# Patient Record
Sex: MALE | Race: OTHER | NOT HISPANIC OR LATINO | URBAN - METROPOLITAN AREA
[De-identification: names, ages, dates, MRNs, and addresses within clinical notes are randomized per-mention and may not be internally consistent; named-entity substitution may affect disease eponyms.]

---

## 2019-03-01 ENCOUNTER — OUTPATIENT (OUTPATIENT)
Dept: OUTPATIENT SERVICES | Facility: HOSPITAL | Age: 74
LOS: 1 days | End: 2019-03-01
Payer: MEDICAID

## 2019-03-01 PROCEDURE — G9001: CPT

## 2019-03-18 ENCOUNTER — EMERGENCY (EMERGENCY)
Facility: HOSPITAL | Age: 74
LOS: 1 days | Discharge: HOME | End: 2019-03-18
Attending: EMERGENCY MEDICINE
Payer: MEDICAID

## 2019-03-18 VITALS
RESPIRATION RATE: 20 BRPM | DIASTOLIC BLOOD PRESSURE: 96 MMHG | WEIGHT: 139.99 LBS | SYSTOLIC BLOOD PRESSURE: 153 MMHG | TEMPERATURE: 98 F | OXYGEN SATURATION: 98 % | HEART RATE: 79 BPM

## 2019-03-18 LAB
ALBUMIN SERPL ELPH-MCNC: 4.8 G/DL — SIGNIFICANT CHANGE UP (ref 3.3–5)
ALP SERPL-CCNC: 56 U/L — SIGNIFICANT CHANGE UP (ref 40–120)
ALT FLD-CCNC: 25 U/L — SIGNIFICANT CHANGE UP (ref 10–45)
ANION GAP SERPL CALC-SCNC: 14 MMOL/L — SIGNIFICANT CHANGE UP (ref 5–17)
APTT BLD: 30.5 SEC — SIGNIFICANT CHANGE UP (ref 27.5–36.3)
AST SERPL-CCNC: 20 U/L — SIGNIFICANT CHANGE UP (ref 10–40)
BASOPHILS # BLD AUTO: 0 K/UL — SIGNIFICANT CHANGE UP (ref 0–0.2)
BASOPHILS NFR BLD AUTO: 0.4 % — SIGNIFICANT CHANGE UP (ref 0–2)
BILIRUB SERPL-MCNC: 0.4 MG/DL — SIGNIFICANT CHANGE UP (ref 0.2–1.2)
BUN SERPL-MCNC: 21 MG/DL — SIGNIFICANT CHANGE UP (ref 7–23)
CALCIUM SERPL-MCNC: 9.8 MG/DL — SIGNIFICANT CHANGE UP (ref 8.4–10.5)
CHLORIDE SERPL-SCNC: 106 MMOL/L — SIGNIFICANT CHANGE UP (ref 96–108)
CO2 SERPL-SCNC: 23 MMOL/L — SIGNIFICANT CHANGE UP (ref 22–31)
CREAT SERPL-MCNC: 1.11 MG/DL — SIGNIFICANT CHANGE UP (ref 0.5–1.3)
EOSINOPHIL # BLD AUTO: 0.1 K/UL — SIGNIFICANT CHANGE UP (ref 0–0.5)
EOSINOPHIL NFR BLD AUTO: 0.6 % — SIGNIFICANT CHANGE UP (ref 0–6)
GLUCOSE SERPL-MCNC: 101 MG/DL — HIGH (ref 70–99)
HCT VFR BLD CALC: 43.1 % — SIGNIFICANT CHANGE UP (ref 39–50)
HGB BLD-MCNC: 15.3 G/DL — SIGNIFICANT CHANGE UP (ref 13–17)
INR BLD: 1.13 RATIO — SIGNIFICANT CHANGE UP (ref 0.88–1.16)
LYMPHOCYTES # BLD AUTO: 2.6 K/UL — SIGNIFICANT CHANGE UP (ref 1–3.3)
LYMPHOCYTES # BLD AUTO: 23.4 % — SIGNIFICANT CHANGE UP (ref 13–44)
MCHC RBC-ENTMCNC: 31.7 PG — SIGNIFICANT CHANGE UP (ref 27–34)
MCHC RBC-ENTMCNC: 35.5 GM/DL — SIGNIFICANT CHANGE UP (ref 32–36)
MCV RBC AUTO: 89.3 FL — SIGNIFICANT CHANGE UP (ref 80–100)
MONOCYTES # BLD AUTO: 0.7 K/UL — SIGNIFICANT CHANGE UP (ref 0–0.9)
MONOCYTES NFR BLD AUTO: 6.8 % — SIGNIFICANT CHANGE UP (ref 2–14)
NEUTROPHILS # BLD AUTO: 7.5 K/UL — HIGH (ref 1.8–7.4)
NEUTROPHILS NFR BLD AUTO: 68.8 % — SIGNIFICANT CHANGE UP (ref 43–77)
PLATELET # BLD AUTO: 167 K/UL — SIGNIFICANT CHANGE UP (ref 150–400)
POTASSIUM SERPL-MCNC: 4.3 MMOL/L — SIGNIFICANT CHANGE UP (ref 3.5–5.3)
POTASSIUM SERPL-SCNC: 4.3 MMOL/L — SIGNIFICANT CHANGE UP (ref 3.5–5.3)
PROT SERPL-MCNC: 7.8 G/DL — SIGNIFICANT CHANGE UP (ref 6–8.3)
PROTHROM AB SERPL-ACNC: 13 SEC — HIGH (ref 10–12.9)
RBC # BLD: 4.83 M/UL — SIGNIFICANT CHANGE UP (ref 4.2–5.8)
RBC # FLD: 12.4 % — SIGNIFICANT CHANGE UP (ref 10.3–14.5)
SODIUM SERPL-SCNC: 143 MMOL/L — SIGNIFICANT CHANGE UP (ref 135–145)
TROPONIN T, HIGH SENSITIVITY RESULT: 12 NG/L — SIGNIFICANT CHANGE UP (ref 0–51)
WBC # BLD: 11 K/UL — HIGH (ref 3.8–10.5)
WBC # FLD AUTO: 11 K/UL — HIGH (ref 3.8–10.5)

## 2019-03-18 PROCEDURE — 70450 CT HEAD/BRAIN W/O DYE: CPT | Mod: 26

## 2019-03-18 PROCEDURE — 99284 EMERGENCY DEPT VISIT MOD MDM: CPT | Mod: 25

## 2019-03-18 PROCEDURE — 93010 ELECTROCARDIOGRAM REPORT: CPT

## 2019-03-18 NOTE — ED ADULT TRIAGE NOTE - CHIEF COMPLAINT QUOTE
c/o numbness on tongue around 10pm last night, also noticed slurring of speech that started around 8pm last night.

## 2019-03-18 NOTE — ED STATDOCS - OBJECTIVE STATEMENT
72 y/o male with pmhx of HLD, HTN, and DM Type II c/o yesterday evening felt some numbness in his tongue. States that there is no improvement in current sxs today. Son notes that there is some change in his speech and slowed gait. Pt also notes some leg pain and weakness/numbness in hands however sxs has resolved. Pt had a stress test at cardiologist today at 8:30; blood test showed his cholesterol is high. Currently on simvastatin.

## 2019-03-18 NOTE — ED STATDOCS - NS_ ATTENDINGSCRIBEDETAILS _ED_A_ED_FT
The scribe's documentation has been prepared under my direction and personally reviewed by me in its entirety. I confirm that the note above accurately reflects all work, treatment, procedures, and medical decision making performed by me.  ANNALISE Tucker MD

## 2019-03-18 NOTE — ED ADULT TRIAGE NOTE - RESPIRATORY RATE (BREATHS/MIN)
[facility] Case Criteria    Suitable to On-board:  Yes   Successfully On-boarded:  Pending   Reason for On-boarded Pending:  Letter Sent, Will attempt with phone call   Date Sent:  1/15/19   Reason(s) for Pending On-boarded Narrative:  1/15/19: [facility] Welcome Letter sent.      STEPHENIE Ford, RN  Telephonic RN Care Coordinator  Phone: [phone]   20

## 2019-03-19 VITALS
TEMPERATURE: 98 F | RESPIRATION RATE: 16 BRPM | HEART RATE: 63 BPM | DIASTOLIC BLOOD PRESSURE: 97 MMHG | SYSTOLIC BLOOD PRESSURE: 143 MMHG | OXYGEN SATURATION: 99 %

## 2019-03-19 PROCEDURE — 70498 CT ANGIOGRAPHY NECK: CPT

## 2019-03-19 PROCEDURE — 93005 ELECTROCARDIOGRAM TRACING: CPT

## 2019-03-19 PROCEDURE — 70496 CT ANGIOGRAPHY HEAD: CPT | Mod: 26

## 2019-03-19 PROCEDURE — 84484 ASSAY OF TROPONIN QUANT: CPT

## 2019-03-19 PROCEDURE — 70496 CT ANGIOGRAPHY HEAD: CPT

## 2019-03-19 PROCEDURE — 70498 CT ANGIOGRAPHY NECK: CPT | Mod: 26

## 2019-03-19 PROCEDURE — 80053 COMPREHEN METABOLIC PANEL: CPT

## 2019-03-19 PROCEDURE — 99284 EMERGENCY DEPT VISIT MOD MDM: CPT

## 2019-03-19 PROCEDURE — 85027 COMPLETE CBC AUTOMATED: CPT

## 2019-03-19 PROCEDURE — 70450 CT HEAD/BRAIN W/O DYE: CPT

## 2019-03-19 PROCEDURE — 85610 PROTHROMBIN TIME: CPT

## 2019-03-19 PROCEDURE — 85730 THROMBOPLASTIN TIME PARTIAL: CPT

## 2019-03-19 NOTE — ED ADULT NURSE REASSESSMENT NOTE - NS ED NURSE REASSESS COMMENT FT1
Report received from YESENIA Silverman . Pt AAOx4, NAD, resp nonlabored, skin warm/dry, resting comfortably in bed. Pt denies headache, dizziness, chest pain, palpitations, SOB, abd pain, n/v/d, urinary symptoms, fevers, chills, weakness at this time. Pt passed dysphagia screening. Pt endorses he came in because he felt his speech was slurry and numbness in his mouth. Upon assessment, pt did not display slurry speech and denies numbness in his mouth.

## 2019-03-19 NOTE — ED PROVIDER NOTE - CLINICAL SUMMARY MEDICAL DECISION MAKING FREE TEXT BOX
73M hx dm, htn, hld, on asa p/w slurred speech/facial droop. Outside interventional window. High risk/ABCD2 score. Will consult neuro to discuss inpatient vs outpatient MRI/neuro workup.

## 2019-03-19 NOTE — ED PROVIDER NOTE - PROGRESS NOTE DETAILS
AG Pgy2: consulted neuro - will come to evaluate patient. Pt seen by neurology will d/c home with urgent follow up with Dr. Kinney, pt has already scheduled an appointment. Will get MRI outpatient.

## 2019-03-19 NOTE — ED PROVIDER NOTE - OBJECTIVE STATEMENT
73M hx DM, htn, hld p/w tongue numbness. Patient reports tongue heaviness/slurred speech beginning sunday night. Symptoms persisted until last night so decided to come into the ER. Family notes slight R sided facial droop and slurred speech. Patient had stress test yesterday that was WNL, but told his cholesterol was very high. Denies headache, focal extremity weakness/sensory changes, dysphagia, vision changes, dizziness, no other complaints. 73M hx DM, htn, hld p/w tongue numbness. Patient reports tongue heaviness/slurred speech beginning  night. Symptoms persisted until last night so decided to come into the ER. Family notes slight R sided facial droop and slurred speech. Patient had stress test yesterday that was WNL, but told his cholesterol was very high. Denies headache, focal extremity weakness/sensory changes, dysphagia, vision changes, dizziness, no other complaints.    Attendinyo male presents with slurred speech since  night.  pt notes constant symptoms and no other weakness.  no pain.

## 2019-03-19 NOTE — ED ADULT NURSE NOTE - OBJECTIVE STATEMENT
72 yo M pmh of HTN, DM2, HCL came to ED c/o numbness and tingling in the tongue with slurred speech starting 3/17/19 in the evening.  As per son, pt went to cardiologist yesterday for stress test but symptoms started prior to assessment.  Denies CP, back pain, SOB, fevers/chills, n/v/d, lightheadedness, dizziness, changes in urinary or bowel habits.  A&Ox4, gross neuro intact, +strength and sensation bilaterally in all extremities, able to answer questions and follow commands.  Skin w/d/i.   VSS.  Safety and comfort maintained.  Family present at bedside. Will continue to monitor.

## 2019-03-19 NOTE — ED PROVIDER NOTE - CARE PROVIDER_API CALL
Nelson Kinney (DO)  Neurology; Vascular Neurology  [address] [facility] [address]  [place] [address]  Phone: [phone]  Fax: [phone]  Follow Up Time:

## 2019-03-19 NOTE — CONSULT NOTE ADULT - SUBJECTIVE AND OBJECTIVE BOX
KARIME [id number] is a 73y old  Male who presents with a chief complaint of     HPI: 72 yo M with history of HTN, HLD, DM, presents to the ER with slurred speech for 2 days. Symptoms started abruptly on Sunday afternoon at 5 pm. He also reported some difficulty with writing.  No diplopia, dysphagia, numbness/tingling. Denies any facial weakness, tinnitus. Still w/ some slurred speech.  NIHSS 1. MRS 0.    MEDICATIONS  (STANDING):    MEDICATIONS  (PRN):    PAST MEDICAL & SURGICAL HISTORY:  Hypercholesterolemia  HTN (hypertension)  Diabetes type 2, controlled    FAMILY HISTORY:    Allergies    No Known Allergies    Intolerances        SHx - No smoking, No ETOH, No drug abuse      Review of Systems:    see hpi      Vital Signs Last 24 Hrs  T(C): 36.4 (19 Mar 2019 08:00), Max: 36.6 (18 Mar 2019 20:35)  T(F): 97.6 (19 Mar 2019 08:00), Max: 97.8 (18 Mar 2019 20:35)  HR: 63 (19 Mar 2019 08:00) (63 - 79)  BP: 143/97 (19 Mar 2019 08:00) (143/97 - 153/96)  BP(mean): --  RR: 16 (19 Mar 2019 08:00) (16 - 20)  SpO2: 99% (19 Mar 2019 08:00) (98% - 99%)    Neurological Exam:    Mental Status: Orientated to self, date and place.  Attention intact.  No dysarthria. Speech fluent.  Cranial Nerves:   PERRL, EOMI, VFF, no nystagmus. CN V1-3 intact to light touch.  No facial asymmetry. mild to moderate dysarthria. Tongue, uvula and palate midline.  Sternocleidomastoid and Trapezius intact bilaterally.    Motor:   Tone: normal.                    Strength:       [] Upper extremity                      Delt       Bicep    Tricep                                                  R         5/5        5/5        5/5       5/5                                               L          5/5        5/5        5/5       5/5    [] Lower extremity                       HF          KE          KF        DF         PF                                               R        5/5        5/5        5/5       5/5       5/5                                               L         5/5        5/5       5/5       5/5        5/5    rapid finger movements slower on left than right  rolling arm test w/ right arm orbiting around left arm    Pronator drift: none                 Dysmetria: None to finger-nose-finger or heel-shin-heel  No truncal ataxia.    Tremor: No resting, postural or action tremor.  No myoclonus.    Sensation: intact to light touch, vibration and proprioception    Deep Tendon Reflexes:     Biceps          Triceps      BR        Patellar        Ankle         Babinski                                         R       2+                   2+           2+            2+               2+           downgoing                                         L        2+                  2+           3+            2+               2+           downgoing    Gait: normal.      Other:    03-18    143  |  106  |  21  ----------------------------<  101<H>  4.3   |  23  |  1.11    Ca    9.8      18 Mar 2019 21:59    TPro  7.8  /  Alb  4.8  /  TBili  0.4  /  DBili  x   /  AST  20  /  ALT  25  /  AlkPhos  56  03-18                            15.3   11.0  )-----------( 167      ( 18 Mar 2019 21:59 )             43.1       Radiology    CT: < from: CT Angio Head w/ IV Cont (03.19.19 @ 08:36) >  IMPRESSION:       Tortuous extracranial vessels likely reflecting hypertension without   significant ICA origin stenosis.    ICA cavernous and supraclinoid atherosclerotic calcification with   stenosis, patent proximal anterior, middle, and posterior cerebral   arteries, fetal leftPCA. Volume loss, microvascular disease, remote and   age indeterminate lacunar infarcts, MR is more sensitive for new stroke   like symptoms. No new hemorrhage or shift..    < end of copied text >    MRI  EKG:  tele:  TTE:  EEG:

## 2019-03-19 NOTE — CONSULT NOTE ADULT - ASSESSMENT
Impression: Probably had a small lacunar stroke causing slurred speech, and some subtle left side weakness (fine finger movements and arm rolling test). CTH shows age-indeterminate infarcts. CTA h/n negative. Can follow up outpatient, but will need close outpatient follow up.    Plan:  -c/w ASA 81  -increase atorvastatin to 80 mg  -MRI brain w/o contrast to be done outpatient  -MRA head w/o contrast to be done outpatient  -MRA neck w/ contrast to be done outpatient  -remainder of workup can be done outpatient    pending discussion with attending neurologist Impression: Probably had a small lacunar stroke causing slurred speech, and some subtle left side weakness (fine finger movements and arm rolling test). CTH shows age-indeterminate infarcts. CTA h/n negative. Can follow up outpatient, but will need close outpatient follow up.    Plan:  -c/w ASA 81  -increase atorvastatin to 80 mg  -MRI brain w/o contrast to be done outpatient  -remainder of workup can be done outpatient    case discussed with Dr. Nelson Kinney, attending neurologist Impression: Probably had a small lacunar stroke causing slurred speech, and some subtle left side weakness (fine finger movements and arm rolling test). CTH shows age-indeterminate infarcts. CTA h/n negative. Can follow up outpatient, but will need close outpatient follow up.    Plan:  -c/w ASA 81  -increase atorvastatin to 80 mg  -MRI brain w/o contrast to be done outpatient  -remainder of workup can be done outpatient  -patient can follow up with Dr. Nelson Kinney, attending neurologist. Phone: [phone]    case discussed with Dr. Nelson Kinney, attending neurologist

## 2019-03-19 NOTE — ED PROVIDER NOTE - NEUROLOGICAL, MLM
A&Ox3, slightly slurred speech, slight R lower facial droop, cn2-12 otherwise grossly intact. Normal gait.

## 2019-03-19 NOTE — ED PROVIDER NOTE - NSFOLLOWUPINSTRUCTIONS_ED_ALL_ED_FT
1. Return to ED for worsening, progressive or any other concerning symptoms   2. Follow up with your primary care doctor in 2-3days  3. Follow up with Dr. Kinney  4. Return for worsening of speech, numbness or tingling in body.  5. Increase your simvastatin to 80mg daily.

## 2019-03-19 NOTE — ED ADULT NURSE NOTE - CHPI ED NUR SYMPTOMS NEG
no dizziness/no fever/no change in level of consciousness/no weakness/no vomiting/no confusion/no nausea/no numbness/no blurred vision/no loss of consciousness

## 2019-03-19 NOTE — ED ADULT NURSE NOTE - NSIMPLEMENTINTERV_GEN_ALL_ED
Implemented All Universal Safety Interventions:  [place] to call system. Call bell, personal items and telephone within reach. Instruct patient to call for assistance. Room bathroom lighting operational. Non-slip footwear when patient is off stretcher. Physically safe environment: no spills, clutter or unnecessary equipment. Stretcher in lowest position, wheels locked, appropriate side rails in place.

## 2019-03-20 DIAGNOSIS — Z71.89 OTHER SPECIFIED COUNSELING: ICD-10-CM

## 2019-03-28 PROBLEM — Z00.00 ENCOUNTER FOR PREVENTIVE HEALTH EXAMINATION: Status: ACTIVE | Noted: 2019-03-28

## 2019-03-29 PROBLEM — E78.00 PURE HYPERCHOLESTEROLEMIA, UNSPECIFIED: Chronic | Status: ACTIVE | Noted: 2019-03-19

## 2019-03-29 PROBLEM — I10 ESSENTIAL (PRIMARY) HYPERTENSION: Chronic | Status: ACTIVE | Noted: 2019-03-19

## 2019-03-29 PROBLEM — E11.9 TYPE 2 DIABETES MELLITUS WITHOUT COMPLICATIONS: Chronic | Status: ACTIVE | Noted: 2019-03-19

## 2019-04-11 ENCOUNTER — OUTPATIENT (OUTPATIENT)
Dept: OUTPATIENT SERVICES | Facility: HOSPITAL | Age: 74
LOS: 1 days | End: 2019-04-11
Payer: MEDICAID

## 2019-04-11 ENCOUNTER — APPOINTMENT (OUTPATIENT)
Dept: MRI IMAGING | Facility: IMAGING CENTER | Age: 74
End: 2019-04-11
Payer: MEDICAID

## 2019-04-11 DIAGNOSIS — R51 HEADACHE: ICD-10-CM

## 2019-04-11 PROCEDURE — 70551 MRI BRAIN STEM W/O DYE: CPT

## 2019-04-11 PROCEDURE — 70551 MRI BRAIN STEM W/O DYE: CPT | Mod: 26

## 2021-06-25 ENCOUNTER — EMERGENCY (EMERGENCY)
Facility: HOSPITAL | Age: 76
LOS: 1 days | Discharge: HOME | End: 2021-06-25
Attending: STUDENT IN AN ORGANIZED HEALTH CARE EDUCATION/TRAINING PROGRAM
Payer: MEDICAID

## 2021-06-25 VITALS
RESPIRATION RATE: 19 BRPM | OXYGEN SATURATION: 99 % | DIASTOLIC BLOOD PRESSURE: 78 MMHG | TEMPERATURE: 98 F | HEART RATE: 70 BPM | SYSTOLIC BLOOD PRESSURE: 129 MMHG

## 2021-06-25 VITALS
DIASTOLIC BLOOD PRESSURE: 75 MMHG | OXYGEN SATURATION: 99 % | WEIGHT: 145.06 LBS | HEART RATE: 72 BPM | SYSTOLIC BLOOD PRESSURE: 155 MMHG | TEMPERATURE: 98 F | HEIGHT: 62 IN | RESPIRATION RATE: 18 BRPM

## 2021-06-25 LAB
ALBUMIN SERPL ELPH-MCNC: 4.4 G/DL — SIGNIFICANT CHANGE UP (ref 3.3–5)
ALP SERPL-CCNC: 81 U/L — SIGNIFICANT CHANGE UP (ref 40–120)
ALT FLD-CCNC: 34 U/L — SIGNIFICANT CHANGE UP (ref 10–45)
ANION GAP SERPL CALC-SCNC: 14 MMOL/L — SIGNIFICANT CHANGE UP (ref 5–17)
APPEARANCE UR: CLEAR — SIGNIFICANT CHANGE UP
AST SERPL-CCNC: 25 U/L — SIGNIFICANT CHANGE UP (ref 10–40)
BACTERIA # UR AUTO: NEGATIVE — SIGNIFICANT CHANGE UP
BASOPHILS # BLD AUTO: 0.02 K/UL — SIGNIFICANT CHANGE UP (ref 0–0.2)
BASOPHILS NFR BLD AUTO: 0.2 % — SIGNIFICANT CHANGE UP (ref 0–2)
BILIRUB SERPL-MCNC: 0.5 MG/DL — SIGNIFICANT CHANGE UP (ref 0.2–1.2)
BILIRUB UR-MCNC: NEGATIVE — SIGNIFICANT CHANGE UP
BUN SERPL-MCNC: 19 MG/DL — SIGNIFICANT CHANGE UP (ref 7–23)
CALCIUM SERPL-MCNC: 9.4 MG/DL — SIGNIFICANT CHANGE UP (ref 8.4–10.5)
CHLORIDE SERPL-SCNC: 107 MMOL/L — SIGNIFICANT CHANGE UP (ref 96–108)
CO2 SERPL-SCNC: 19 MMOL/L — LOW (ref 22–31)
COLOR SPEC: SIGNIFICANT CHANGE UP
CREAT SERPL-MCNC: 1.14 MG/DL — SIGNIFICANT CHANGE UP (ref 0.5–1.3)
DIFF PNL FLD: NEGATIVE — SIGNIFICANT CHANGE UP
EOSINOPHIL # BLD AUTO: 0.09 K/UL — SIGNIFICANT CHANGE UP (ref 0–0.5)
EOSINOPHIL NFR BLD AUTO: 0.8 % — SIGNIFICANT CHANGE UP (ref 0–6)
EPI CELLS # UR: 0 /HPF — SIGNIFICANT CHANGE UP
GLUCOSE SERPL-MCNC: 74 MG/DL — SIGNIFICANT CHANGE UP (ref 70–99)
GLUCOSE UR QL: NEGATIVE — SIGNIFICANT CHANGE UP
HCT VFR BLD CALC: 44.7 % — SIGNIFICANT CHANGE UP (ref 39–50)
HGB BLD-MCNC: 15.3 G/DL — SIGNIFICANT CHANGE UP (ref 13–17)
IMM GRANULOCYTES NFR BLD AUTO: 0.4 % — SIGNIFICANT CHANGE UP (ref 0–1.5)
KETONES UR-MCNC: NEGATIVE — SIGNIFICANT CHANGE UP
LEUKOCYTE ESTERASE UR-ACNC: NEGATIVE — SIGNIFICANT CHANGE UP
LYMPHOCYTES # BLD AUTO: 1.83 K/UL — SIGNIFICANT CHANGE UP (ref 1–3.3)
LYMPHOCYTES # BLD AUTO: 16.2 % — SIGNIFICANT CHANGE UP (ref 13–44)
MCHC RBC-ENTMCNC: 29.3 PG — SIGNIFICANT CHANGE UP (ref 27–34)
MCHC RBC-ENTMCNC: 34.2 GM/DL — SIGNIFICANT CHANGE UP (ref 32–36)
MCV RBC AUTO: 85.5 FL — SIGNIFICANT CHANGE UP (ref 80–100)
MONOCYTES # BLD AUTO: 0.7 K/UL — SIGNIFICANT CHANGE UP (ref 0–0.9)
MONOCYTES NFR BLD AUTO: 6.2 % — SIGNIFICANT CHANGE UP (ref 2–14)
NEUTROPHILS # BLD AUTO: 8.63 K/UL — HIGH (ref 1.8–7.4)
NEUTROPHILS NFR BLD AUTO: 76.2 % — SIGNIFICANT CHANGE UP (ref 43–77)
NITRITE UR-MCNC: NEGATIVE — SIGNIFICANT CHANGE UP
NRBC # BLD: 0 /100 WBCS — SIGNIFICANT CHANGE UP (ref 0–0)
PH UR: 5.5 — SIGNIFICANT CHANGE UP (ref 5–8)
PLATELET # BLD AUTO: 173 K/UL — SIGNIFICANT CHANGE UP (ref 150–400)
POTASSIUM SERPL-MCNC: 3.5 MMOL/L — SIGNIFICANT CHANGE UP (ref 3.5–5.3)
POTASSIUM SERPL-SCNC: 3.5 MMOL/L — SIGNIFICANT CHANGE UP (ref 3.5–5.3)
PROT SERPL-MCNC: 8.2 G/DL — SIGNIFICANT CHANGE UP (ref 6–8.3)
PROT UR-MCNC: ABNORMAL
RBC # BLD: 5.23 M/UL — SIGNIFICANT CHANGE UP (ref 4.2–5.8)
RBC # FLD: 12.9 % — SIGNIFICANT CHANGE UP (ref 10.3–14.5)
RBC CASTS # UR COMP ASSIST: 1 /HPF — SIGNIFICANT CHANGE UP (ref 0–4)
SODIUM SERPL-SCNC: 140 MMOL/L — SIGNIFICANT CHANGE UP (ref 135–145)
SP GR SPEC: 1.02 — SIGNIFICANT CHANGE UP (ref 1.01–1.02)
UROBILINOGEN FLD QL: NEGATIVE — SIGNIFICANT CHANGE UP
WBC # BLD: 11.31 K/UL — HIGH (ref 3.8–10.5)
WBC # FLD AUTO: 11.31 K/UL — HIGH (ref 3.8–10.5)
WBC UR QL: 1 /HPF — SIGNIFICANT CHANGE UP (ref 0–5)

## 2021-06-25 PROCEDURE — 80053 COMPREHEN METABOLIC PANEL: CPT

## 2021-06-25 PROCEDURE — 87086 URINE CULTURE/COLONY COUNT: CPT

## 2021-06-25 PROCEDURE — 74177 CT ABD & PELVIS W/CONTRAST: CPT

## 2021-06-25 PROCEDURE — 99284 EMERGENCY DEPT VISIT MOD MDM: CPT | Mod: 25

## 2021-06-25 PROCEDURE — 81001 URINALYSIS AUTO W/SCOPE: CPT

## 2021-06-25 PROCEDURE — 99284 EMERGENCY DEPT VISIT MOD MDM: CPT

## 2021-06-25 PROCEDURE — 85025 COMPLETE CBC W/AUTO DIFF WBC: CPT

## 2021-06-25 PROCEDURE — 74177 CT ABD & PELVIS W/CONTRAST: CPT | Mod: 26,MA

## 2021-06-25 NOTE — ED PROVIDER NOTE - NSFOLLOWUPINSTRUCTIONS_ED_ALL_ED_FT
You were seen in the emergency department for difficulty urinating. You had blood tests, a urine test, and a CAT scan of your abdomen performed. It was found that there may be a malignancy (possible Cancer). Please follow up with a urologist regarding this.    Please also follow up with your primary care physician. Please call to make an appointment.    Please return to the emergency department for worsening of your symptoms.

## 2021-06-25 NOTE — ED PROVIDER NOTE - PROGRESS NOTE DETAILS
Prabhakar Cotter MD. pt hilton dout ot me pending reassment and labs. labs non actionable, including UA.  exam with chaperone dr. mercy clark:no testicular swellin gor tenderness; +cremasteric reflexes bilaterally; pt is not circumcised but no discharge, lesions or penile tenderness. explained possible prostatitis and need for rectal exam: prostate is non tender, firm, not boggy. pt also endorsing constipation. given this, will obtain ct a/p w/ iv contrast. pt agreeable. will reassess Prabhakar Cotter MD. CT noted. pt states in [place], 3 weeks ago, he had a CT and biopsy o fhis prostate and was told estiven the potentially did have prostate CA. I informed him of the results of our CT today re: likely neoplasm of prostate. I urged pt to f/u with urology regardin this. pt Understands. pt to be discharged

## 2021-06-25 NOTE — ED PROVIDER NOTE - CLINICAL SUMMARY MEDICAL DECISION MAKING FREE TEXT BOX
77 y/o M PMH HTN DM on metformin, BPH presents to ED c/o burning w/ urination and low back pain x6 weeks worse today w/ difficulty urinating. no fevers or difficulty walking, leg weakness. concern for UTI, BPH, urinary retention, pyelo. plan labs urine bladder scan

## 2021-06-25 NOTE — ED ADULT NURSE REASSESSMENT NOTE - NS ED NURSE REASSESS COMMENT FT1
Report received from RN JOSSY. PT A/O x3. PT denies pain/ discomfort. Bladder scan performed 232 cc urine present in bladder ED Resident Kg made aware. Daughter contacted and told care plan as instructed by PT.

## 2021-06-25 NOTE — ED ADULT NURSE NOTE - OBJECTIVE STATEMENT
76y m pt just returned from [facility]; c/o pelvic pain x 6 weeks; had ultrasound and ct scan while there; pt last urinated at 10am this morning; pt denies hematuria; aox3; no resp distress; no chest pain; no abd pain; no n/v/d; denies fever/chills; no blood in stool; iv placed; labs drawn; adv pt need clean catch urine sample; explained how; call bell in hand; safety/comfort maintained

## 2021-06-25 NOTE — ED PROVIDER NOTE - PATIENT PORTAL LINK FT
You can access the FollowMyHealth Patient Portal offered by [facility] by registering at the following website: [email or web address]. By joining [facility]’s FollowMyHealth portal, you will also be able to view your health information using other applications (apps) compatible with our system.

## 2021-06-25 NOTE — ED PROVIDER NOTE - NSFOLLOWUPCLINICS_GEN_ALL_ED_FT
[facility] - Urology  Urology  [address], 3rd & 4th [place] [address]  Phone: [phone]  Fax:

## 2021-06-25 NOTE — ED PROVIDER NOTE - PHYSICAL EXAMINATION
Gen: well developed male NAD   HEENT: NCAT, EOMI, no nasal discharge, mucous membranes moist  CV: RRR, +S1/S2, no M/R/G  Resp: CTAB, no W/R/R  GI: Abdomen soft non-distended, NTTP, no masses  : no CVAT b/l   MSK: No open wounds, no bruising, no LE edema. no midline tenderness to c/t/l spine  Neuro: A&Ox4, following commands, moving all four extremities spontaneously  Psych: appropriate mood

## 2021-06-25 NOTE — ED PROVIDER NOTE - OBJECTIVE STATEMENT
77 y/o M PMH HTN DM on metformin, BPH presents to ED c/o burning w/ urination x6 weeks worse today w/ difficulty urinating. reports last urination 10am, feels like he has to pee. 75 y/o M PMH HTN DM on metformin, BPH presents to ED c/o burning w/ urination and low back pain x6 weeks worse today w/ difficulty urinating. reports last urination 10am, feels like he has to pee. Denies trauma or falls. Denies hematuria, abd pain, blood in stool, diarrhea.

## 2021-06-25 NOTE — ED PROVIDER NOTE - ATTENDING CONTRIBUTION TO CARE
76 M w/ hx of pmh of HTN, DM on metformin, BPH presents to the ER w/ burning on urination and low back pain, no hx of falls, plan for labs, UA and reassessment 76 M w/ hx of pmh of HTN, DM on metformin, BPH presents to the ER w/ burning on urination and low back pain, no hx of falls, plan for labs, UA and reassessment; UA clean, rectal exam w/ mild discomfort w/ hx of recent bx will obtain ct imaging, pt w/ findings for possible prostate ca pt to follow up w/ urology verbalized understanding

## 2021-06-26 LAB
CULTURE RESULTS: NO GROWTH — SIGNIFICANT CHANGE UP
SPECIMEN SOURCE: SIGNIFICANT CHANGE UP

## 2021-08-23 ENCOUNTER — APPOINTMENT (OUTPATIENT)
Dept: UROLOGY | Facility: CLINIC | Age: 76
End: 2021-08-23

## 2021-09-02 ENCOUNTER — APPOINTMENT (OUTPATIENT)
Dept: INTERVENTIONAL RADIOLOGY/VASCULAR | Facility: CLINIC | Age: 76
End: 2021-09-02

## 2021-09-02 VITALS
HEIGHT: 68 IN | RESPIRATION RATE: 16 BRPM | SYSTOLIC BLOOD PRESSURE: 147 MMHG | DIASTOLIC BLOOD PRESSURE: 95 MMHG | OXYGEN SATURATION: 99 % | HEART RATE: 68 BPM | BODY MASS INDEX: 21.98 KG/M2 | WEIGHT: 145 LBS

## 2021-09-02 DIAGNOSIS — I10 ESSENTIAL (PRIMARY) HYPERTENSION: ICD-10-CM

## 2021-09-02 DIAGNOSIS — R19.00 INTRA-ABDOMINAL AND PELVIC SWELLING, MASS AND LUMP, UNSPECIFIED SITE: ICD-10-CM

## 2021-09-02 DIAGNOSIS — Z78.9 OTHER SPECIFIED HEALTH STATUS: ICD-10-CM

## 2021-09-02 DIAGNOSIS — Z86.39 PERSONAL HISTORY OF OTHER ENDOCRINE, NUTRITIONAL AND METABOLIC DISEASE: ICD-10-CM

## 2021-09-02 DIAGNOSIS — C61 MALIGNANT NEOPLASM OF PROSTATE: ICD-10-CM

## 2021-09-02 DIAGNOSIS — E11.9 TYPE 2 DIABETES MELLITUS W/OUT COMPLICATIONS: ICD-10-CM

## 2021-09-02 PROCEDURE — XXXXX: CPT | Mod: 1L

## 2021-09-02 RX ORDER — METFORMIN HYDROCHLORIDE 1000 MG/1
1000 TABLET, FILM COATED ORAL
Refills: 0 | Status: ACTIVE | COMMUNITY

## 2021-09-02 RX ORDER — GLIPIZIDE 2.5 MG/1
2.5 TABLET, FILM COATED, EXTENDED RELEASE ORAL
Refills: 0 | Status: ACTIVE | COMMUNITY

## 2021-09-02 RX ORDER — ASPIRIN 81 MG
81 TABLET, DELAYED RELEASE (ENTERIC COATED) ORAL
Refills: 0 | Status: ACTIVE | COMMUNITY

## 2021-09-02 RX ORDER — SIMVASTATIN 80 MG/1
80 TABLET, FILM COATED ORAL
Refills: 0 | Status: ACTIVE | COMMUNITY

## 2021-09-02 RX ORDER — AMLODIPINE AND VALSARTAN 5; 160 MG/1; MG/1
5-160 TABLET, FILM COATED ORAL
Refills: 0 | Status: ACTIVE | COMMUNITY

## 2021-09-02 NOTE — REVIEW OF SYSTEMS
[Fever] : no fever [Loss Of Hearing] : no hearing loss [Chills] : no chills [Chest Pain] : no chest pain [Palpitations] : no palpitations [Shortness Of Breath] : no shortness of breath [Diarrhea] : no diarrhea [Easy Bleeding] : no tendency for easy bleeding [Easy Bruising] : no tendency for easy bruising

## 2021-09-02 NOTE — ASSESSMENT
[FreeTextEntry1] : Patient is a 76 year old male with pelvic mass and recently diagnosed prostate cancer referred for consultation for image guided biopsy. Discussed with patient alternative therapies rather than image-guided biopsy, such as surgical open biopsy versus further imaging studies and/or labs. Patient understands the risks involved with biopsy (i.e. bleeding, infection, etc.). The patient understands the risks versus benefits for an image-guided biopsy and consents to the procedure\par \par -plan NPO after midnight day prior to procedure\par -plan for biopsy (prone) left sided trans-gluteal\par

## 2021-09-02 NOTE — PHYSICAL EXAM
[Alert] : alert [No Respiratory Distress] : no respiratory distress [No Accessory Muscle Use] : no accessory muscle use [Clear to Auscultation] : lungs were clear to auscultation bilaterally [Normal Rate] : heart rate was normal  [Not Tender] : non-tender [Soft] : abdomen soft [Not Distended] : not distended [Oriented x3] : oriented to person, place, and time

## 2021-09-02 NOTE — HISTORY OF PRESENT ILLNESS
[FreeTextEntry1] : 76 years old male with history prostate cancer diagnosed in May 2021 and has not started any treatment yet. He was diagnosed in [place] with prostate cancer.  Pt noted to have urinary retention initially and had CT A/P done that showed large Large mass in the prostate consistent with neoplasm. Additional inseparable presacral mass which is amenable to transgluteal CT-guided biopsy. Indeterminate nodule in Morison's pouch.  Patient had prostate bx in [facility]. \par pt followed up with his urologist and referred the pt to IR for possible presacral mass biopsy. \par \par Denies any recent SOB, CP, fever, chills, n/v/d. \par \par Patient sates she has been feeling well overall. Appetite has been good no unintentional weight loss.\par \par \par Patient states he takes Aspirin 81 mg daily as a preventative and he took it last on 09/01/21. Patient is aware to hold Aspirin for five days prior to biopsy.

## 2021-09-02 NOTE — DATA REVIEWED
[FreeTextEntry1] : CT abdomen images reviewed and results discussed at length with the patient.\par

## 2021-09-03 LAB
ANION GAP SERPL CALC-SCNC: 13 MMOL/L
BASOPHILS # BLD AUTO: 0.02 K/UL
BASOPHILS NFR BLD AUTO: 0.2 %
BUN SERPL-MCNC: 22 MG/DL
CALCIUM SERPL-MCNC: 10 MG/DL
CHLORIDE SERPL-SCNC: 109 MMOL/L
CO2 SERPL-SCNC: 21 MMOL/L
CREAT SERPL-MCNC: 1.18 MG/DL
EOSINOPHIL # BLD AUTO: 0.07 K/UL
EOSINOPHIL NFR BLD AUTO: 0.7 %
GLUCOSE SERPL-MCNC: 175 MG/DL
HCT VFR BLD CALC: 44.1 %
HGB BLD-MCNC: 14.9 G/DL
IMM GRANULOCYTES NFR BLD AUTO: 0.4 %
LYMPHOCYTES # BLD AUTO: 2.13 K/UL
LYMPHOCYTES NFR BLD AUTO: 22.3 %
MAN DIFF?: NORMAL
MCHC RBC-ENTMCNC: 30.3 PG
MCHC RBC-ENTMCNC: 33.8 GM/DL
MCV RBC AUTO: 89.8 FL
MONOCYTES # BLD AUTO: 0.68 K/UL
MONOCYTES NFR BLD AUTO: 7.1 %
NEUTROPHILS # BLD AUTO: 6.63 K/UL
NEUTROPHILS NFR BLD AUTO: 69.3 %
PLATELET # BLD AUTO: NORMAL K/UL
POTASSIUM SERPL-SCNC: 4.6 MMOL/L
RBC # BLD: 4.91 M/UL
RBC # FLD: 14 %
SODIUM SERPL-SCNC: 143 MMOL/L
WBC # FLD AUTO: 9.57 K/UL

## 2021-09-12 ENCOUNTER — APPOINTMENT (OUTPATIENT)
Dept: DISASTER EMERGENCY | Facility: CLINIC | Age: 76
End: 2021-09-12

## 2021-09-12 DIAGNOSIS — Z01.818 ENCOUNTER FOR OTHER PREPROCEDURAL EXAMINATION: ICD-10-CM

## 2021-09-13 ENCOUNTER — APPOINTMENT (OUTPATIENT)
Dept: DISASTER EMERGENCY | Facility: CLINIC | Age: 76
End: 2021-09-13

## 2021-09-13 LAB — SARS-COV-2 N GENE NPH QL NAA+PROBE: NOT DETECTED

## 2021-09-15 ENCOUNTER — OUTPATIENT (OUTPATIENT)
Dept: OUTPATIENT SERVICES | Facility: HOSPITAL | Age: 76
LOS: 1 days | End: 2021-09-15
Payer: MEDICAID

## 2021-09-15 DIAGNOSIS — Q89.9 CONGENITAL MALFORMATION, UNSPECIFIED: ICD-10-CM

## 2021-09-15 DIAGNOSIS — R19.00 INTRA-ABDOMINAL AND PELVIC SWELLING, MASS AND LUMP, UNSPECIFIED SITE: ICD-10-CM

## 2021-09-15 PROCEDURE — 74018 RADEX ABDOMEN 1 VIEW: CPT | Mod: 26

## 2021-09-15 NOTE — CHART NOTE - NSCHARTNOTEFT_GEN_A_CORE
Patient presented to IR for biopsy of large mass in the pelvis involving the prostate and abutting rectum     Most recent available imaging from June of 2021 demonstrating these findings.    Patient was seen in office for biopsy consult in september without interval imaging. Patient states his only surgical history is biopsy of the prostate    Patient was placed prone in the CT scanner and sedated for planned biopsy. On initial imaging, the mass is nearly completely resolved with a small component adjacent to the rectum. The prostate appears intervally resected/reduced in size as well.    Patient was referred to IR by Urologist Rosario Willis, office number of . When this number is called, an answering machine message states that the office is closed until October 11; however, there is an ability to leave a message. I left a message with a call back to my cell phone number.    Given that I was unable to get in touch with the referrer in a timely manner, decision made to defer biopsy at this time. Patient has either been treated for the mass, thereby a diagnosis should be known by someone which would mean the biopsy is not indicated, versus the lesion resolved on its own and likely does not need to be biopsied.     Should Dr Willis still require biopsy to direct treatment, IR can reschedule patient for a biopsy at that time Patient presented to IR for biopsy of large mass in the pelvis involving the prostate and abutting rectum     Most recent available imaging from June of 2021 demonstrating these findings.    Patient was seen in office for biopsy consult in september without interval imaging. Patient states his only surgical history is biopsy of the prostate. Patient says he was given a capsule and a pill for treatment    Patient was placed prone in the CT scanner and sedated for planned biopsy. On initial imaging, the mass is nearly completely resolved with a small component adjacent to the rectum. The prostate appears intervally resected/reduced in size as well.    Patient was referred to IR by Urologist Rosario Willis, office number of . When this number is called, an answering machine message states that the office is closed until October 11; however, there is an ability to leave a message. I left a message with a call back to my cell phone number.    Given that I was unable to get in touch with the referrer in a timely manner, decision made to defer biopsy at this time. Patient has either been treated for the mass, thereby a diagnosis should be known by someone which would mean the biopsy is not indicated, versus the lesion resolved on its own and likely does not need to be biopsied.     Should Dr Willis still require biopsy to direct treatment, IR can reschedule patient for a biopsy at that time    Update:    Contacted patient's PMD Meaghan Gonzalez ([phone]) regarding patient. Dr Gonzalez states that patient had been previously evaluated in [place] with ultrasound and biopsy. Most recent imaging that she had was CT from June 2021 and that imaging is available to me as well. She states that the patient told her he had been receiving injections from the urologist regarding treatment for the mass. She says the patient does not have any additional surgical history.    Given significant reduction in size of lesion, unclear treatment and surgical history, would defer biopsy at this time. Discussed with Dr Gonzalez and patient repeating imaging in 1 month and if lesion is still present, can re-discuss biopsying at that time. Would recommend MRI of the pelvis with and without contrast if no contraindications or CT Abdomen/Pelvis with contrast    Patient and Dr Gonzalez were amenable to plan Patient presented to IR for biopsy of large mass in the pelvis involving the prostate and abutting rectum     Most recent available imaging from June of 2021 demonstrating these findings.    Patient was seen in office for biopsy consult in september without interval imaging. Patient states his only surgical history is biopsy of the prostate. Patient says he was given a capsule and a pill for treatment    Patient was placed prone in the CT scanner and sedated for planned biopsy. On initial imaging, the mass is nearly completely resolved with a small component adjacent to the rectum. The prostate appears intervally resected/reduced in size as well.    Patient was referred to IR by Urologist Rosario Willis, office number of . When this number is called, an answering machine message states that the office is closed until October 11; however, there is an ability to leave a message. I left a message with a call back to my cell phone number.    Given that I was unable to get in touch with the referrer, decision made to defer biopsy at this time. Patient has either been treated for the mass, thereby a diagnosis should be known by someone which would mean the biopsy is not indicated, versus the lesion resolved on its own and likely does not need to be biopsied.     Should Dr Willis still require biopsy to direct treatment, IR can reschedule patient for a biopsy at that time    Update:    Contacted patient's PMD Meaghan Gonzalez ([phone]) regarding patient. Dr Gonzalez states that patient had been previously evaluated in [place] with ultrasound and biopsy. Most recent imaging that she had was CT from June 2021 and that imaging is available to me as well. She states that the patient told her he had been receiving injections from the urologist regarding treatment for the mass. She says the patient does not have any additional surgical history.    Given significant reduction in size of lesion, unclear treatment and surgical history but presumed response, would defer biopsy at this time. Discussed with Dr Gonzalez and patient repeating imaging in 1 month and if lesion is still present, can re-discuss biopsying at that time. Would recommend MRI of the pelvis with and without contrast if no contraindications or CT Abdomen/Pelvis with contrast    Patient and Dr Gonzalez were amenable to plan

## 2021-09-22 DIAGNOSIS — C61 MALIGNANT NEOPLASM OF PROSTATE: ICD-10-CM

## 2021-09-22 DIAGNOSIS — R19.00 INTRA-ABDOMINAL AND PELVIC SWELLING, MASS AND LUMP, UNSPECIFIED SITE: ICD-10-CM

## 2021-10-14 ENCOUNTER — APPOINTMENT (OUTPATIENT)
Dept: MRI IMAGING | Facility: IMAGING CENTER | Age: 76
End: 2021-10-14
Payer: MEDICAID

## 2021-10-14 ENCOUNTER — OUTPATIENT (OUTPATIENT)
Dept: OUTPATIENT SERVICES | Facility: HOSPITAL | Age: 76
LOS: 1 days | End: 2021-10-14
Payer: MEDICAID

## 2021-10-14 ENCOUNTER — TRANSCRIPTION ENCOUNTER (OUTPATIENT)
Age: 76
End: 2021-10-14

## 2021-10-14 DIAGNOSIS — C61 MALIGNANT NEOPLASM OF PROSTATE: ICD-10-CM

## 2021-10-14 DIAGNOSIS — D49.89 NEOPLASM OF UNSPECIFIED BEHAVIOR OF OTHER SPECIFIED SITES: ICD-10-CM

## 2021-10-14 PROCEDURE — 72197 MRI PELVIS W/O & W/DYE: CPT

## 2021-10-14 PROCEDURE — A9585: CPT

## 2021-10-14 PROCEDURE — 76498P: CUSTOM | Mod: 26

## 2021-10-14 PROCEDURE — 76498 UNLISTED MR PROCEDURE: CPT

## 2021-10-14 PROCEDURE — 72197 MRI PELVIS W/O & W/DYE: CPT | Mod: 26

## 2022-09-11 ENCOUNTER — EMERGENCY (EMERGENCY)
Facility: HOSPITAL | Age: 77
LOS: 1 days | Discharge: HOME | End: 2022-09-11
Attending: EMERGENCY MEDICINE
Payer: MEDICAID

## 2022-09-11 VITALS
DIASTOLIC BLOOD PRESSURE: 89 MMHG | SYSTOLIC BLOOD PRESSURE: 176 MMHG | RESPIRATION RATE: 18 BRPM | OXYGEN SATURATION: 97 % | WEIGHT: 143.08 LBS | HEART RATE: 69 BPM | HEIGHT: 62 IN | TEMPERATURE: 98 F

## 2022-09-11 LAB
ALBUMIN SERPL ELPH-MCNC: 4.6 G/DL — SIGNIFICANT CHANGE UP (ref 3.3–5)
ALP SERPL-CCNC: 74 U/L — SIGNIFICANT CHANGE UP (ref 40–120)
ALT FLD-CCNC: 26 U/L — SIGNIFICANT CHANGE UP (ref 10–45)
ANION GAP SERPL CALC-SCNC: 12 MMOL/L — SIGNIFICANT CHANGE UP (ref 5–17)
APPEARANCE UR: ABNORMAL
APTT BLD: 32.8 SEC — SIGNIFICANT CHANGE UP (ref 27.5–35.5)
AST SERPL-CCNC: 26 U/L — SIGNIFICANT CHANGE UP (ref 10–40)
BACTERIA # UR AUTO: NEGATIVE — SIGNIFICANT CHANGE UP
BILIRUB SERPL-MCNC: 0.4 MG/DL — SIGNIFICANT CHANGE UP (ref 0.2–1.2)
BILIRUB UR-MCNC: NEGATIVE — SIGNIFICANT CHANGE UP
BUN SERPL-MCNC: 24 MG/DL — HIGH (ref 7–23)
CALCIUM SERPL-MCNC: 9.8 MG/DL — SIGNIFICANT CHANGE UP (ref 8.4–10.5)
CHLORIDE SERPL-SCNC: 106 MMOL/L — SIGNIFICANT CHANGE UP (ref 96–108)
CO2 SERPL-SCNC: 23 MMOL/L — SIGNIFICANT CHANGE UP (ref 22–31)
COLOR SPEC: ABNORMAL
CREAT SERPL-MCNC: 1.45 MG/DL — HIGH (ref 0.5–1.3)
DIFF PNL FLD: ABNORMAL
EGFR: 50 ML/MIN/1.73M2 — LOW
EPI CELLS # UR: 3 /HPF — SIGNIFICANT CHANGE UP
GLUCOSE SERPL-MCNC: 155 MG/DL — HIGH (ref 70–99)
GLUCOSE UR QL: ABNORMAL
HCT VFR BLD CALC: 39.6 % — SIGNIFICANT CHANGE UP (ref 39–50)
HGB BLD-MCNC: 13.6 G/DL — SIGNIFICANT CHANGE UP (ref 13–17)
HYALINE CASTS # UR AUTO: 2 /LPF — SIGNIFICANT CHANGE UP (ref 0–2)
INR BLD: 1.11 RATIO — SIGNIFICANT CHANGE UP (ref 0.88–1.16)
KETONES UR-MCNC: NEGATIVE — SIGNIFICANT CHANGE UP
LEUKOCYTE ESTERASE UR-ACNC: ABNORMAL
MAGNESIUM SERPL-MCNC: 2 MG/DL — SIGNIFICANT CHANGE UP (ref 1.6–2.6)
MCHC RBC-ENTMCNC: 30.4 PG — SIGNIFICANT CHANGE UP (ref 27–34)
MCHC RBC-ENTMCNC: 34.3 GM/DL — SIGNIFICANT CHANGE UP (ref 32–36)
MCV RBC AUTO: 88.4 FL — SIGNIFICANT CHANGE UP (ref 80–100)
NITRITE UR-MCNC: NEGATIVE — SIGNIFICANT CHANGE UP
PH UR: 5.5 — SIGNIFICANT CHANGE UP (ref 5–8)
POTASSIUM SERPL-MCNC: 3.8 MMOL/L — SIGNIFICANT CHANGE UP (ref 3.5–5.3)
POTASSIUM SERPL-SCNC: 3.8 MMOL/L — SIGNIFICANT CHANGE UP (ref 3.5–5.3)
PROT SERPL-MCNC: 7.7 G/DL — SIGNIFICANT CHANGE UP (ref 6–8.3)
PROT UR-MCNC: ABNORMAL
PROTHROM AB SERPL-ACNC: 12.9 SEC — SIGNIFICANT CHANGE UP (ref 10.5–13.4)
RBC # BLD: 4.48 M/UL — SIGNIFICANT CHANGE UP (ref 4.2–5.8)
RBC # FLD: 12.8 % — SIGNIFICANT CHANGE UP (ref 10.3–14.5)
RBC CASTS # UR COMP ASSIST: 1431 /HPF — HIGH (ref 0–4)
SODIUM SERPL-SCNC: 141 MMOL/L — SIGNIFICANT CHANGE UP (ref 135–145)
SP GR SPEC: 1.02 — SIGNIFICANT CHANGE UP (ref 1.01–1.02)
UROBILINOGEN FLD QL: NEGATIVE — SIGNIFICANT CHANGE UP
WBC # BLD: 11.06 K/UL — HIGH (ref 3.8–10.5)
WBC # FLD AUTO: 11.06 K/UL — HIGH (ref 3.8–10.5)
WBC UR QL: 17 /HPF — HIGH (ref 0–5)

## 2022-09-11 PROCEDURE — 99284 EMERGENCY DEPT VISIT MOD MDM: CPT | Mod: 25

## 2022-09-11 PROCEDURE — 80053 COMPREHEN METABOLIC PANEL: CPT

## 2022-09-11 PROCEDURE — 99285 EMERGENCY DEPT VISIT HI MDM: CPT

## 2022-09-11 PROCEDURE — 74176 CT ABD & PELVIS W/O CONTRAST: CPT | Mod: 26,MA

## 2022-09-11 PROCEDURE — 85610 PROTHROMBIN TIME: CPT

## 2022-09-11 PROCEDURE — 81001 URINALYSIS AUTO W/SCOPE: CPT

## 2022-09-11 PROCEDURE — 74176 CT ABD & PELVIS W/O CONTRAST: CPT | Mod: MA

## 2022-09-11 PROCEDURE — 85730 THROMBOPLASTIN TIME PARTIAL: CPT

## 2022-09-11 PROCEDURE — 87086 URINE CULTURE/COLONY COUNT: CPT

## 2022-09-11 PROCEDURE — 83735 ASSAY OF MAGNESIUM: CPT

## 2022-09-11 PROCEDURE — 85025 COMPLETE CBC W/AUTO DIFF WBC: CPT

## 2022-09-11 NOTE — ED PROVIDER NOTE - OBJECTIVE STATEMENT
77M with hx of prostate cancer, HTN, DM2  hematuria and dysuria for 2 days 77M with hx of prostate cancer on lupron every 3 months (urologist Dr. Lund), HTN, DM2, hypothyroidism p/w hematuria and dysuria for 3 days. Patient reports that he started noticing drops of blood in his urine with associated burning pain on urination. No clots passed. No recent trauma or instrumentation, no back pain, no f/c. No hx of kidney stones. Not taking AC. Able to urinate okay but started noticing drops of blood from urethra that would persist despite stopped urinary stream. Progressively worsening bloody urine with frequent episodes so came to ED for evaluation

## 2022-09-11 NOTE — ED PROVIDER NOTE - NSICDXPASTMEDICALHX_GEN_ALL_CORE_FT
PAST MEDICAL HISTORY:  Diabetes type 2, controlled     HTN (hypertension)     Hypercholesterolemia     Hypothyroidism     Prostate cancer

## 2022-09-11 NOTE — ED PROVIDER NOTE - PROGRESS NOTE DETAILS
u/a with RBC, no bacteriuria. CTAP without kidney stones or bladder mass. discussed results with patient and family at bedside. all questions answered. encouraged patient to follow up with urologist outpatient for likely cystoscopy

## 2022-09-11 NOTE — ED PROVIDER NOTE - NS ED ROS FT
Constitutional: no fever and no chills  Eyes: no discharge, no irritation, no pain, no visual changes  ENMT: no ear pain or hearing loss, no dysphagia or throat pain  Neck: no pain, no stiffness, no swollen glands  CV: no chest pain, no palpitations, no edema  Resp: no cough, no shortness of breath  Abd: no abdominal pain, no nausea or vomiting, no diarrhea  : +dysuria, +hematuria  MSK: no back pain, no neck pain, no joint pain  Neuro: no LOC, no gait abnormality, no headache, no sensory deficits, no weakness  Skin: no rashes, no lacerations, no lesions

## 2022-09-11 NOTE — ED ADULT NURSE NOTE - OBJECTIVE STATEMENT
77M with hx of prostate cancer on lupron every 3 months (urologist Dr. Lund), HTN, DM2, hypothyroidism p/w hematuria and dysuria for 3 days. Patient reports that he started noticing drops of blood in his urine with associated burning pain on urination. No clots passed. No recent trauma or instrumentation, no back pain, no f/c. No hx of kidney stones. Not taking AC. Able to urinate okay but started noticing drops of blood from urethra that would persist despite stopped urinary stream. Progressively worsening bloody urine with frequent episodes so came to ED for evaluation

## 2022-09-11 NOTE — ED PROVIDER NOTE - CLINICAL SUMMARY MEDICAL DECISION MAKING FREE TEXT BOX
77M with hx of prostate cancer on lupron every 3 months (urologist Dr. Lund), HTN, DM2, hypothyroidism p/w hematuria and dysuria for 3 days. will r/o kidney stones, UTI, bladder tumor with labs, u/a, urine cultures, CTAP noncon

## 2022-09-11 NOTE — ED PROVIDER NOTE - PHYSICAL EXAMINATION
PHYSICAL EXAM:  GENERAL: Sitting comfortable in bed, in no acute distress  HENMT: Atraumatic, moist mucous membranes, no oropharyngeal exudates or vesicles, uvula is midline EYES: Clear bilaterally, PERRL, EOMs intact b/l  HEART: RRR, S1/S2, no murmur/gallops/rubs  RESPIRATORY: Clear to auscultation bilaterally, no wheezes/rhonchi/rales  ABDOMEN: +BS, soft, nontender, nondistended, no CVA tenderness  EXTREMITIES: No lower extremity edema, +2 radial pulses b/l  NEURO:  A&Ox4, no focal motor deficits or sensory deficits   Heme/LYMPH: No ecchymosis or bruising, no anterior/posterior cervical or supraclavicular LAD  SKIN:  Skin normal color for race, warm, dry and intact. No evidence of rash.

## 2022-09-11 NOTE — ED PROVIDER NOTE - PATIENT PORTAL LINK FT
You can access the FollowMyHealth Patient Portal offered by [facility] by registering at the following website: [email or web address]. By joining [email or web address]’s FollowMyHealth portal, you will also be able to view your health information using other applications (apps) compatible with our system.

## 2022-09-11 NOTE — ED PROVIDER NOTE - NSFOLLOWUPINSTRUCTIONS_ED_ALL_ED_FT
You came to the hospital because you had bloody urine and burning pain with urination. You got a urine test which showed that you had red blood cells in your urine but no bacteria or other evidence of a UTI. You got a cat scan of your abdomen and pelvis which didn't show a kidney stone or bladder mass which may explain your symptoms. Please follow up with your urologist Dr. Willis upon discharge to continue your evaluation.

## 2022-09-11 NOTE — ED PROVIDER NOTE - ATTENDING CONTRIBUTION TO CARE
76 yo male hx of prostate CA p/w gross hematuria.  no evidence of obstruction.  denies having this previously.  CT non-con pelvis stone eval, UA, reassess.

## 2022-09-11 NOTE — ED ADULT NURSE NOTE - NSICDXPASTSURGICALHX_GEN_ALL_CORE_FT
Verified Results  TSH WITH REFLEX 13Mar2017 10:36PM DAVION FRAUSTO     Test Name Result Flag Reference   TSH 0.711 mcUnits/mL  0.350-5.000   Findings most consistent with euthyroid state, no additional testing suggested. TSH may be normal in patients with thyroid dysfunction and pituitary disease. Clinical correlation recommended.  (Reflex TSH algorithm is not recommended in hospitalized patients. A variety of drugs, as well as serious acute and chronic illnesses may alter thyroid function tests. Commonly implicated drugs include glucocorticoids, dopamine, carbamazepine, iodine, amiodarone, lithium and heparin.)     COMP METABOLIC PANEL WITH CBCA (CPNL,CBCA) 13Mar2017 10:36PM DAVION FRAUSTO     Test Name Result Flag Reference   WHITE BLOOD COUNT 7.3 K/mcL  4.2-11.0   RED CELL COUNT 5.04 mil/mcL  4.50-5.90   HEMOGLOBIN 15.9 g/dl  13.0-17.0   HEMATOCRIT 45.9 %  39.0-51.0   MEAN CORPUSCULAR VOLUME 91.1 fL  78.0-100.0   MEAN CORPUSCULAR HEMOGLOBIN 31.5 pg  26.0-34.0   MEAN CORPUSCULAR HGB CONC 34.6 g/dl  32.0-36.5   RDW-CV 12.7 %  11.0-15.0   PLATELET COUNT 361 K/mcL  140-450   DIFF TYPE      AUTOMATED DIFFERENTIAL   CHRISTOPHER% 61 %     LYM% 29 %     MON% 9 %     EOS% 1 %     BASO% 0 %     CHRISTOPHER ABS 4.4 K/mcL  1.8-7.7   LYM ABS 2.1 K/mcL  1.0-4.8   MON ABS 0.7 K/mcL  0.3-0.9   EOS ABS 0.1 K/mcL  0.1-0.5   BASO ABS 0.0 K/mcL  0.0-0.3   FASTING STATUS UNKNOWN hrs     SODIUM 142 mmol/L  135-145   POTASSIUM 4.5 mmol/L  3.4-5.1   CHLORIDE 102 mmol/L     CARBON DIOXIDE 28 mmol/L  21-32   ANION GAP 16 mmol/L  10-20   GLUCOSE 94 mg/dl  65-99   BUN 10 mg/dl  6-20   CREATININE 1.07 mg/dl  0.67-1.17   GFR EST. AMER >90     In patients with known chronic kidney disease, the stage of disease based on eGFR is interpreted as follows:  eGFR results = or >90 mL/min/1.73m2 = Stage I normal kidney function.   GFR EST.NONAFRI AMER 82     In patients with known chronic kidney disease, the stage of disease based on eGFR is interpreted as  follows:  eGFR results 60-89 mL/min/1.73m2 = Stage II CKD (chronic kidney disease), or mild kidney disease.   BUN/CREATININE RATIO 9  7-25   CALCIUM 9.8 mg/dl  8.4-10.2   BILIRUBIN TOTAL 0.4 mg/dl  0.2-1.0   GOT/AST 31 Units/L  <38   GPT/ALT 34 Units/L  <79   ALKALINE PHOSPHATASE 89 Units/L     TOTAL PROTEIN 8.1 g/dl  6.4-8.2   ALBUMIN 4.8 g/dl  3.6-5.1   GLOBULIN (CALCULATED) 3.3 g/dl  2.0-4.0   A/G RATIO 1.5  1.0-2.4     VITAMIN D,25 HYDROXY 13Mar2017 10:36PM DAVION FRAUSTO   [Mar 20, 2017 7:12PM DAVION FRAUSTO]  Labs in good range with the exception of vitamin D which is low consistent with vitamin D deficiency. Please take ergocalciferol 50,000 units once a week for 8 weeks and repeat the vitamin D level in 8 weeks.    Thyroid was in good range.     Test Name Result Flag Reference   VIT D,25 HYDROXY 19.1 ng/ml L 30.0-100.0   <20  ng/mL=Vitamin D deficiency  20-29  ng/mL=Vitamin D insufficiency   ng/mL=Optimal Vitamin D  >150 ng/mL=Possible toxicity     URINALYSIS WITH MICROSCOPIC EXAM W/O C/S 13Mar2017 12:01AM DAVION FRAUSTO     Test Name Result Flag Reference   URINE COLOR YELLOW  YELLOW   APPEARANCE, URINE CLEAR     URINE GLUCOSE NEGATIVE mg/dl  NEGATIVE   URINE BILIRUBIN NEGATIVE  NEGATIVE   KETONES NEGATIVE mg/dl  NEGATIVE   URINE SPECIFIC GRAVITY 1.016  1.005-1.030   RBC-URINE NEGATIVE  NEGATIVE   PH-URINE 6.0 Units  5.0-7.0   URINE PROTEIN NEGATIVE mg/dl  NEGATIVE   UROBILINOGEN-URINE 0.2 mg/dl  0.0-1.0   NITRITE NEGATIVE  NEGATIVE   WBC-URINE NEGATIVE  NEGATIVE   SQUAMOUS EPITHELIAL CELLS 1 to 5 /HPF  0-5   ERYTHROCYTES NONE SEEN /HPF  0-3   LEUKOCYTES 1 to 5 /HPF  0-5   BACTERIA NONE SEEN /HPF  NONE SEEN   HYALINE CASTS NONE SEEN /LPF  0-5   SPECIMEN TYPE      URINE, CLEAN CATCH/MIDSTREAM   MUCUS PRESENT       TEST IN QUESTION, REFRIGERATED 13Mar2017 12:01AM DAVION FRAUSTO     Test Name Result Flag Reference   TEST IN QUESTION, REFRIGERATED      GRAY TOP URINE TUBE RECEIVED WITHOUT A TEST  ORDERED. TRIED CALLING BUT UNABLE TO RESOLVE ISSUE. IF TESTING IS NEEDED PLEASE CONTACT CLIENT SERVICES AT [phone].        PAST SURGICAL HISTORY:  No significant past surgical history

## 2022-09-11 NOTE — ED PROVIDER NOTE - CARE PLAN
Principal Discharge DX:	Hematuria   1 Principal Discharge DX:	Hematuria  Assessment and plan of treatment:	- CTAP noncon, u/a and culture, CBC, CMP, coags

## 2022-09-12 VITALS
TEMPERATURE: 98 F | HEART RATE: 60 BPM | OXYGEN SATURATION: 95 % | RESPIRATION RATE: 16 BRPM | DIASTOLIC BLOOD PRESSURE: 84 MMHG | SYSTOLIC BLOOD PRESSURE: 150 MMHG

## 2022-09-12 LAB
BASOPHILS # BLD AUTO: 0.03 K/UL — SIGNIFICANT CHANGE UP (ref 0–0.2)
BASOPHILS NFR BLD AUTO: 0.3 % — SIGNIFICANT CHANGE UP (ref 0–2)
CULTURE RESULTS: NO GROWTH — SIGNIFICANT CHANGE UP
EOSINOPHIL # BLD AUTO: 0.16 K/UL — SIGNIFICANT CHANGE UP (ref 0–0.5)
EOSINOPHIL NFR BLD AUTO: 1.4 % — SIGNIFICANT CHANGE UP (ref 0–6)
IMM GRANULOCYTES NFR BLD AUTO: 0.3 % — SIGNIFICANT CHANGE UP (ref 0–1.5)
LYMPHOCYTES # BLD AUTO: 3.36 K/UL — HIGH (ref 1–3.3)
LYMPHOCYTES # BLD AUTO: 30.4 % — SIGNIFICANT CHANGE UP (ref 13–44)
MONOCYTES # BLD AUTO: 0.74 K/UL — SIGNIFICANT CHANGE UP (ref 0–0.9)
MONOCYTES NFR BLD AUTO: 6.7 % — SIGNIFICANT CHANGE UP (ref 2–14)
NEUTROPHILS # BLD AUTO: 6.74 K/UL — SIGNIFICANT CHANGE UP (ref 1.8–7.4)
NEUTROPHILS NFR BLD AUTO: 60.9 % — SIGNIFICANT CHANGE UP (ref 43–77)
NRBC # BLD: 0 /100 WBCS — SIGNIFICANT CHANGE UP (ref 0–0)
PLATELET # BLD AUTO: SIGNIFICANT CHANGE UP K/UL (ref 150–400)
SPECIMEN SOURCE: SIGNIFICANT CHANGE UP

## 2024-09-30 ENCOUNTER — INPATIENT (INPATIENT)
Facility: HOSPITAL | Age: 79
LOS: 0 days | Discharge: HOME | End: 2024-10-01
Attending: HOSPITALIST | Admitting: HOSPITALIST
Payer: MEDICAID

## 2024-09-30 VITALS
OXYGEN SATURATION: 98 % | SYSTOLIC BLOOD PRESSURE: 139 MMHG | WEIGHT: 145.06 LBS | TEMPERATURE: 98 F | DIASTOLIC BLOOD PRESSURE: 69 MMHG | RESPIRATION RATE: 16 BRPM | HEART RATE: 83 BPM | HEIGHT: 65 IN

## 2024-09-30 DIAGNOSIS — I25.10 ATHEROSCLEROTIC HEART DISEASE OF NATIVE CORONARY ARTERY WITHOUT ANGINA PECTORIS: ICD-10-CM

## 2024-09-30 PROBLEM — E03.9 HYPOTHYROIDISM, UNSPECIFIED: Chronic | Status: ACTIVE | Noted: 2022-09-11

## 2024-09-30 PROBLEM — C61 MALIGNANT NEOPLASM OF PROSTATE: Chronic | Status: ACTIVE | Noted: 2022-09-11

## 2024-09-30 LAB
ANION GAP SERPL CALC-SCNC: 14 MMOL/L — SIGNIFICANT CHANGE UP (ref 7–14)
BUN SERPL-MCNC: 25 MG/DL — HIGH (ref 7–23)
CALCIUM SERPL-MCNC: 9.3 MG/DL — SIGNIFICANT CHANGE UP (ref 8.4–10.5)
CHLORIDE SERPL-SCNC: 107 MMOL/L — SIGNIFICANT CHANGE UP (ref 98–107)
CO2 SERPL-SCNC: 21 MMOL/L — LOW (ref 22–31)
CREAT SERPL-MCNC: 1.45 MG/DL — HIGH (ref 0.5–1.3)
EGFR: 49 ML/MIN/1.73M2 — LOW
GLUCOSE BLDC GLUCOMTR-MCNC: 112 MG/DL — HIGH (ref 70–99)
GLUCOSE BLDC GLUCOMTR-MCNC: 112 MG/DL — HIGH (ref 70–99)
GLUCOSE BLDC GLUCOMTR-MCNC: 171 MG/DL — HIGH (ref 70–99)
GLUCOSE BLDC GLUCOMTR-MCNC: 91 MG/DL — SIGNIFICANT CHANGE UP (ref 70–99)
GLUCOSE SERPL-MCNC: 116 MG/DL — HIGH (ref 70–99)
HCT VFR BLD CALC: 39.1 % — SIGNIFICANT CHANGE UP (ref 39–50)
HGB BLD-MCNC: 13 G/DL — SIGNIFICANT CHANGE UP (ref 13–17)
MCHC RBC-ENTMCNC: 28.9 PG — SIGNIFICANT CHANGE UP (ref 27–34)
MCHC RBC-ENTMCNC: 33.2 GM/DL — SIGNIFICANT CHANGE UP (ref 32–36)
MCV RBC AUTO: 86.9 FL — SIGNIFICANT CHANGE UP (ref 80–100)
NRBC # BLD: 0 /100 WBCS — SIGNIFICANT CHANGE UP (ref 0–0)
NRBC # FLD: 0 K/UL — SIGNIFICANT CHANGE UP (ref 0–0)
PLATELET # BLD AUTO: 148 K/UL — LOW (ref 150–400)
POTASSIUM SERPL-MCNC: 3.9 MMOL/L — SIGNIFICANT CHANGE UP (ref 3.5–5.3)
POTASSIUM SERPL-SCNC: 3.9 MMOL/L — SIGNIFICANT CHANGE UP (ref 3.5–5.3)
RBC # BLD: 4.5 M/UL — SIGNIFICANT CHANGE UP (ref 4.2–5.8)
RBC # FLD: 12.6 % — SIGNIFICANT CHANGE UP (ref 10.3–14.5)
SODIUM SERPL-SCNC: 142 MMOL/L — SIGNIFICANT CHANGE UP (ref 135–145)
WBC # BLD: 7.69 K/UL — SIGNIFICANT CHANGE UP (ref 3.8–10.5)
WBC # FLD AUTO: 7.69 K/UL — SIGNIFICANT CHANGE UP (ref 3.8–10.5)

## 2024-09-30 PROCEDURE — 93010 ELECTROCARDIOGRAM REPORT: CPT

## 2024-09-30 RX ORDER — ALCOHOL ANTISEPTIC PADS
12.5 PADS, MEDICATED (EA) TOPICAL ONCE
Refills: 0 | Status: DISCONTINUED | OUTPATIENT
Start: 2024-09-30 | End: 2024-10-01

## 2024-09-30 RX ORDER — SODIUM CHLORIDE 0.9 % (FLUSH) 0.9 %
250 SYRINGE (ML) INJECTION ONCE
Refills: 0 | Status: COMPLETED | OUTPATIENT
Start: 2024-09-30 | End: 2024-09-30

## 2024-09-30 RX ORDER — AMLODIPINE BESYLATE 5 MG
5 TABLET ORAL DAILY
Refills: 0 | Status: DISCONTINUED | OUTPATIENT
Start: 2024-09-30 | End: 2024-10-01

## 2024-09-30 RX ORDER — SODIUM CHLORIDE 0.9 % (FLUSH) 0.9 %
500 SYRINGE (ML) INJECTION
Refills: 0 | Status: DISCONTINUED | OUTPATIENT
Start: 2024-09-30 | End: 2024-10-01

## 2024-09-30 RX ORDER — ALCOHOL ANTISEPTIC PADS
25 PADS, MEDICATED (EA) TOPICAL ONCE
Refills: 0 | Status: DISCONTINUED | OUTPATIENT
Start: 2024-09-30 | End: 2024-10-01

## 2024-09-30 RX ORDER — SODIUM CHLORIDE IRRIG SOLUTION 0.9 %
1000 SOLUTION, IRRIGATION IRRIGATION
Refills: 0 | Status: DISCONTINUED | OUTPATIENT
Start: 2024-09-30 | End: 2024-10-01

## 2024-09-30 RX ORDER — FENOFIBRATE NANOCRYSTALLIZED 145 MG
48 TABLET ORAL AT BEDTIME
Refills: 0 | Status: DISCONTINUED | OUTPATIENT
Start: 2024-09-30 | End: 2024-10-01

## 2024-09-30 RX ORDER — METFORMIN HCL 500 MG
1 TABLET ORAL
Refills: 0 | DISCHARGE

## 2024-09-30 RX ORDER — TICAGRELOR 60 MG/1
90 TABLET ORAL EVERY 12 HOURS
Refills: 0 | Status: DISCONTINUED | OUTPATIENT
Start: 2024-10-01 | End: 2024-10-01

## 2024-09-30 RX ORDER — FENOFIBRATE NANOCRYSTALLIZED 145 MG
1 TABLET ORAL
Refills: 0 | DISCHARGE

## 2024-09-30 RX ORDER — SODIUM CHLORIDE 0.9 % (FLUSH) 0.9 %
3 SYRINGE (ML) INJECTION EVERY 8 HOURS
Refills: 0 | Status: DISCONTINUED | OUTPATIENT
Start: 2024-09-30 | End: 2024-10-01

## 2024-09-30 RX ORDER — GLIPIZIDE 5 MG/1
1 TABLET ORAL
Refills: 0 | DISCHARGE

## 2024-09-30 RX ORDER — TICAGRELOR 60 MG/1
1 TABLET ORAL
Qty: 60 | Refills: 11
Start: 2024-09-30 | End: 2025-09-24

## 2024-09-30 RX ORDER — AMLODIPINE BESYLATE 5 MG
1 TABLET ORAL
Refills: 0 | DISCHARGE

## 2024-09-30 RX ORDER — ATORVASTATIN CALCIUM 10 MG/1
80 TABLET, FILM COATED ORAL AT BEDTIME
Refills: 0 | Status: DISCONTINUED | OUTPATIENT
Start: 2024-09-30 | End: 2024-10-01

## 2024-09-30 RX ORDER — GLUCAGON INJECTION, SOLUTION 0.5 MG/.1ML
1 INJECTION, SOLUTION SUBCUTANEOUS ONCE
Refills: 0 | Status: DISCONTINUED | OUTPATIENT
Start: 2024-09-30 | End: 2024-10-01

## 2024-09-30 RX ORDER — ASPIRIN 325 MG
81 TABLET ORAL DAILY
Refills: 0 | Status: DISCONTINUED | OUTPATIENT
Start: 2024-10-01 | End: 2024-10-01

## 2024-09-30 RX ORDER — INSULIN LISPRO 100/ML
VIAL (ML) SUBCUTANEOUS
Refills: 0 | Status: DISCONTINUED | OUTPATIENT
Start: 2024-09-30 | End: 2024-10-01

## 2024-09-30 RX ORDER — ASPIRIN 325 MG
1 TABLET ORAL
Refills: 0 | DISCHARGE

## 2024-09-30 RX ORDER — EMPAGLIFLOZIN 25 MG/1
1 TABLET, FILM COATED ORAL
Refills: 0 | DISCHARGE

## 2024-09-30 RX ORDER — ATORVASTATIN CALCIUM 10 MG/1
1 TABLET, FILM COATED ORAL
Refills: 0 | DISCHARGE

## 2024-09-30 RX ORDER — ALCOHOL ANTISEPTIC PADS
15 PADS, MEDICATED (EA) TOPICAL ONCE
Refills: 0 | Status: DISCONTINUED | OUTPATIENT
Start: 2024-09-30 | End: 2024-10-01

## 2024-09-30 RX ADMIN — Medication 48 MILLIGRAM(S): at 22:04

## 2024-09-30 RX ADMIN — Medication 3 MILLILITER(S): at 21:47

## 2024-09-30 RX ADMIN — Medication 3 MILLILITER(S): at 16:12

## 2024-09-30 RX ADMIN — Medication 75 MILLILITER(S): at 12:10

## 2024-09-30 RX ADMIN — ATORVASTATIN CALCIUM 80 MILLIGRAM(S): 10 TABLET, FILM COATED ORAL at 22:04

## 2024-09-30 RX ADMIN — Medication 1000 MILLILITER(S): at 12:09

## 2024-09-30 RX ADMIN — Medication 75 MILLILITER(S): at 16:11

## 2024-09-30 NOTE — H&P CARDIOLOGY - COMMENTS
Pre-sedation evaluation    Dentures:   Last PO intake:    Level of consciousness: 1  Obstructive sleep apnea: No  Aspiration risk: No  Mallampati score: 2  ASA Classification: 2  Prior Sedative or Anesthesia Experience: No complications  Informed consent by responsible adult: Yes  Responsible adult escort: Yes  Based on today's assessment, anesthesia consult requested: No Pre-sedation evaluation    Dentures: none  Last PO intake: 9/29/24 22:00    Level of consciousness: 1  Obstructive sleep apnea: No  Aspiration risk: No  Mallampati score: 2  ASA Classification: 2  Prior Sedative or Anesthesia Experience: No complications  Informed consent by responsible adult: Yes  Responsible adult escort: Yes  Based on today's assessment, anesthesia consult requested: No

## 2024-09-30 NOTE — PROVIDER CONTACT NOTE (MEDICATION) - SITUATION
The patient is s/p PCI LAD stent x2.   TO be started on Brilinta and Aspirin  Prescription for Brilinta sent to patient's pharmacy, $o copay.

## 2024-09-30 NOTE — PATIENT PROFILE ADULT - PATIENT REPRESENTATIVE: ( YOU CAN CHOOSE ANY PERSON THAT CAN ASSIST YOU WITH YOUR HEALTH CARE PREFERENCES, DOES NOT HAVE TO BE A SPOUSE, IMMEDIATE FAMILY OR SIGNIFICANT OTHER/PARTNER)
CONSTITUTIONAL: Awake, alert.  Nontoxic, no acute distress.    HEAD: Normocephalic, atraumatic.    EYES: Normal appearing lids.  PERRL.   No raccoon eyes.    ENT:   Ears: External ear normal appearing without tenderness.  No keller signs.  Nose: Normal appearing nose, nasal mucosa is pink and moist. + dried blood in b/l nares (L>R) with green mucus present.  The nasal septum is midline, no septal hematoma.   Throat: Oral mucosa is pink and moist with good dentition. Tongue normal in appearance without lesions and with good symmetrical movement. No buccal nodules or lesions are noted. The pharynx is normal in appearance without tonsillar swelling or exudates.  Uvula midline.  No trismus.  No submandibular/ submental lymphadenopathy.    NECK: supple, trachea midline.  No midline or paraspinal ttp    HEART:  Normal rate, regular rhythm.  Heart sounds S1, S2.  No murmurs, rubs or gallops.    LUNGS:  No acute respiratory distress.  Non-tachypneic and non-labored.  Lungs are clear bilaterally with good aeration.  No wheezing, rales, rhonchi.    BACK:  No obvious deformity.  No midline or paraspinal ttp    ABDOMEN: Normal appearing skin without lesions, rashes.  Normal bowel sounds x 4.  Soft, non-distended, non-tender in all four quadrants. No rebound or guarding. No hernias or masses palpable.  No pulsatile abdominal mass.       MUSCULOSKELETAL:  Normal appearing extremities without obvious deformity, rash, ecchymosis, erythema.  No swelling.  Warm. No focal tenderness.  FROM b/l upper and lower extremities.  5/5 strength b/l upper and lower ext.  Sensation and motor function grossly intact.  Strong equal peripheral pulses b/l.    SKIN: Skin in warm, dry and intact without rashes or lesions.  Appropriate color for ethnicity.    NEUROLOGICAL:  Awake, alert, oriented to self.  Occasional one word answers.  Follows some commands.  (Baseline per aide).  Face symmetric. PERRL, No gross motor deficit, 5/5 strength throughout.  Normal gait.    PSYCH: Appropriate mood and affect. Good judgment and insight. declines

## 2024-09-30 NOTE — ASU PATIENT PROFILE, ADULT - FALL HARM RISK - UNIVERSAL INTERVENTIONS
Bed in lowest position, wheels locked, appropriate side rails in place/Call bell, personal items and telephone in reach/Instruct patient to call for assistance before getting out of bed or chair/Non-slip footwear when patient is out of bed/[place] to call system/Physically safe environment - no spills, clutter or unnecessary equipment/Purposeful Proactive Rounding/Room/bathroom lighting operational, light cord in reach

## 2024-09-30 NOTE — H&P CARDIOLOGY - HISTORY OF PRESENT ILLNESS
79 y.o. male presents today for elective cardiac catheterization 79 y.o. male with PMH of HTN, HLD, Diabetes Mellitus 2,  Prostate cancer, Hypothyroidism - presents today for elective cardiac catheterization. The patient c/o midsternal chest pain started 1.5 years ago, on and off, 1-2/10, aggravate with exertion (climbing 2 flights of stairs), resolve with rest. He admits to SOB with exertion, occasional dizziness. The patient denies palpitations, presyncope, syncope,  headache, visual disturbances, CVA, PE, DVT, AJ, abdominal pain, N/V/D/C, hematochezia, melena, dysuria, hematuria, fever, chills. The patient was evaluated by a cardiologist, was found to have normal  stress test. Due to patient's symptoms, the patient  was recommended to have cardiac catheterization. The patient denies any complaints at present.     referring physician, Dr. Romeo  stress test 1/5/24 -  EF 74%, no evidence of stress induced ischemia.   Echo 1/2024 - EF 58%, Mild diastolic dysfunction

## 2024-09-30 NOTE — PATIENT PROFILE ADULT - FALL HARM RISK - HARM RISK INTERVENTIONS
Assistance with ambulation/Assistance OOB with selected safe patient handling equipment/Communicate Risk of Fall with Harm to all staff/Discuss with provider need for PT consult/Monitor gait and stability/Reinforce activity limits and safety measures with patient and family/Review medications for side effects contributing to fall risk/Sit up slowly, dangle for a short time, stand at bedside before walking/Tailored Fall Risk Interventions/Toileting schedule using arm’s reach rule for commode and bathroom/Use of alarms - bed, chair and/or voice tab/Visual Cue: Yellow wristband and red socks/Bed in lowest position, wheels locked, appropriate side rails in place/Call bell, personal items and telephone in reach/Instruct patient to call for assistance before getting out of bed or chair/Non-slip footwear when patient is out of bed/[place] to call system/Physically safe environment - no spills, clutter or unnecessary equipment/Purposeful Proactive Rounding/Room/bathroom lighting operational, light cord in reach

## 2024-09-30 NOTE — PATIENT PROFILE ADULT - FALL HARM RISK - FACTORS
Dizziness/Impaired gait/IV and/or equipment tethered to patient/Medication side effects/Post procedure/Weakness

## 2024-09-30 NOTE — PATIENT PROFILE ADULT - ABILITY TO HEAR (WITH HEARING AID OR HEARING APPLIANCE IF NORMALLY USED):
Pt has hearing aids/Mildly to Moderately Impaired: difficulty hearing in some environments or speaker may need to increase volume or speak distinctly

## 2024-09-30 NOTE — H&P CARDIOLOGY - NS ATTEND AMEND GEN_ALL_CORE FT
Patient seen and examined. Agree with plan as detailed in PA/NP Note.     Harry Flaherty MD  Pager: [phone]

## 2024-09-30 NOTE — ASU PREOP CHECKLIST - WEIGHT IN LBS
Noted.  I have sent a prescription for a few days of prednisone to [facility].  She should start on it this evening if possible.  Pod A, go ahead and work her in Thursday or Friday for recheck.  Thanks.   145

## 2024-10-01 ENCOUNTER — TRANSCRIPTION ENCOUNTER (OUTPATIENT)
Age: 79
End: 2024-10-01

## 2024-10-01 VITALS
OXYGEN SATURATION: 96 % | RESPIRATION RATE: 18 BRPM | SYSTOLIC BLOOD PRESSURE: 117 MMHG | DIASTOLIC BLOOD PRESSURE: 73 MMHG | HEART RATE: 67 BPM | TEMPERATURE: 98 F

## 2024-10-01 DIAGNOSIS — E11.9 TYPE 2 DIABETES MELLITUS WITHOUT COMPLICATIONS: ICD-10-CM

## 2024-10-01 DIAGNOSIS — I10 ESSENTIAL (PRIMARY) HYPERTENSION: ICD-10-CM

## 2024-10-01 DIAGNOSIS — C61 MALIGNANT NEOPLASM OF PROSTATE: ICD-10-CM

## 2024-10-01 DIAGNOSIS — E78.00 PURE HYPERCHOLESTEROLEMIA, UNSPECIFIED: ICD-10-CM

## 2024-10-01 DIAGNOSIS — E03.9 HYPOTHYROIDISM, UNSPECIFIED: ICD-10-CM

## 2024-10-01 LAB
A1C WITH ESTIMATED AVERAGE GLUCOSE RESULT: 6.7 % — HIGH (ref 4–5.6)
ANION GAP SERPL CALC-SCNC: 12 MMOL/L — SIGNIFICANT CHANGE UP (ref 7–14)
BUN SERPL-MCNC: 20 MG/DL — SIGNIFICANT CHANGE UP (ref 7–23)
CALCIUM SERPL-MCNC: 9.5 MG/DL — SIGNIFICANT CHANGE UP (ref 8.4–10.5)
CHLORIDE SERPL-SCNC: 106 MMOL/L — SIGNIFICANT CHANGE UP (ref 98–107)
CO2 SERPL-SCNC: 20 MMOL/L — LOW (ref 22–31)
CREAT SERPL-MCNC: 1.36 MG/DL — HIGH (ref 0.5–1.3)
EGFR: 53 ML/MIN/1.73M2 — LOW
ESTIMATED AVERAGE GLUCOSE: 146 — SIGNIFICANT CHANGE UP
GLUCOSE BLDC GLUCOMTR-MCNC: 118 MG/DL — HIGH (ref 70–99)
GLUCOSE BLDC GLUCOMTR-MCNC: 133 MG/DL — HIGH (ref 70–99)
GLUCOSE SERPL-MCNC: 132 MG/DL — HIGH (ref 70–99)
POTASSIUM SERPL-MCNC: 3.8 MMOL/L — SIGNIFICANT CHANGE UP (ref 3.5–5.3)
POTASSIUM SERPL-SCNC: 3.8 MMOL/L — SIGNIFICANT CHANGE UP (ref 3.5–5.3)
SODIUM SERPL-SCNC: 138 MMOL/L — SIGNIFICANT CHANGE UP (ref 135–145)

## 2024-10-01 RX ORDER — TICAGRELOR 60 MG/1
1 TABLET ORAL
Qty: 60 | Refills: 0
Start: 2024-10-01 | End: 2024-10-30

## 2024-10-01 RX ORDER — TICAGRELOR 60 MG/1
1 TABLET ORAL
Qty: 60 | Refills: 3
Start: 2024-10-01 | End: 2025-01-28

## 2024-10-01 RX ADMIN — Medication 81 MILLIGRAM(S): at 12:11

## 2024-10-01 RX ADMIN — Medication 5 MILLIGRAM(S): at 06:55

## 2024-10-01 RX ADMIN — Medication 88 MICROGRAM(S): at 05:49

## 2024-10-01 RX ADMIN — TICAGRELOR 90 MILLIGRAM(S): 60 TABLET ORAL at 06:55

## 2024-10-01 RX ADMIN — Medication 3 MILLILITER(S): at 05:47

## 2024-10-01 NOTE — CONSULT NOTE ADULT - ASSESSMENT
79 y.o. male with PMH of HTN, HLD, Diabetes Mellitus 2,  Prostate cancer, Hypothyroidism sp stent to LAD   CKD stage 3a   Hypertension     1 Renal- Assess creatinine in am; No need for renal sono  Check UA and assess for proteinuria   2 CVS- If able will start Valsartan in am   Hydralazine if needed to control the BP   3 Endo-Maximize statins as u are doing       Sayed [facility]   [phone] 
  79 y.o. male with PMH of HTN, HLD, Diabetes Mellitus 2,  Prostate cancer, Hypothyroidism sp stent to LAD

## 2024-10-01 NOTE — CONSULT NOTE ADULT - SUBJECTIVE AND OBJECTIVE BOX
DATE OF SERVICE: 10-01-24 @ 13:51    Patient is a 79y old  Male who presents with a chief complaint of Cardiac Cath (01 Oct 2024 10:48)      HPI:  79 y.o. male with PMH of HTN, HLD, Diabetes Mellitus 2,  Prostate cancer, Hypothyroidism - presents today for elective cardiac catheterization. The patient c/o midsternal chest pain started 1.5 years ago, on and off, 1-2/10, aggravate with exertion (climbing 2 flights of stairs), resolve with rest. He admits to SOB with exertion, occasional dizziness. The patient denies palpitations, presyncope, syncope,  headache, visual disturbances, CVA, PE, DVT, AJ, abdominal pain, N/V/D/C, hematochezia, melena, dysuria, hematuria, fever, chills. The patient was evaluated by a cardiologist, was found to have normal  stress test. Due to patient's symptoms, the patient  was recommended to have cardiac catheterization. The patient denies any complaints at present.       PAST MEDICAL & SURGICAL HISTORY:  Diabetes type 2, controlled      HTN (hypertension)      Hypercholesterolemia      Prostate cancer      Hypothyroidism      No significant past surgical history          Review of Systems:   CONSTITUTIONAL: No fever, weight loss, or fatigue  EYES: No eye pain, visual disturbances, or discharge  ENMT:  No difficulty hearing, tinnitus, vertigo; No sinus or throat pain  NECK: No pain or stiffness  RESPIRATORY: No cough, wheezing, chills or hemoptysis; No shortness of breath  CARDIOVASCULAR: No chest pain, palpitations, dizziness, leg swelling or sob  GASTROINTESTINAL: No abdominal pain. No nausea, vomiting, diarrhea or constipation  GENITOURINARY: No dysuria, frequency, hematuria, or incontinence  NEUROLOGICAL: No headaches, memory loss, loss of strength, numbness, or tremors      Allergies    No Known Allergies    Intolerances        Social History: non smoker  no IVDA  no ETOH abuse   lives with family     FAMILY HISTORY: negative DM HTN CAD Cancer       MEDICATIONS  (STANDING):  amLODIPine   Tablet 5 milliGRAM(s) Oral daily  aspirin enteric coated 81 milliGRAM(s) Oral daily  atorvastatin 80 milliGRAM(s) Oral at bedtime  dextrose 5%. 1000 milliLiter(s) (100 mL/Hr) IV Continuous <Continuous>  dextrose 5%. 1000 milliLiter(s) (50 mL/Hr) IV Continuous <Continuous>  dextrose 50% Injectable 25 Gram(s) IV Push once  dextrose 50% Injectable 12.5 Gram(s) IV Push once  dextrose 50% Injectable 25 Gram(s) IV Push once  fenofibrate Tablet 48 milliGRAM(s) Oral at bedtime  glucagon  Injectable 1 milliGRAM(s) IntraMuscular once  insulin lispro (ADMELOG) corrective regimen sliding scale   SubCutaneous three times a day before meals  levothyroxine 88 MICROGram(s) Oral daily  sodium chloride 0.9% lock flush 3 milliLiter(s) IV Push every 8 hours  sodium chloride 0.9%. 500 milliLiter(s) (75 mL/Hr) IV Continuous <Continuous>  sodium chloride 0.9%. 500 milliLiter(s) (75 mL/Hr) IV Continuous <Continuous>  ticagrelor 90 milliGRAM(s) Oral every 12 hours    MEDICATIONS  (PRN):  dextrose Oral Gel 15 Gram(s) Oral once PRN Blood Glucose LESS THAN 70 milliGRAM(s)/deciliter      CAPILLARY BLOOD GLUCOSE      POCT Blood Glucose.: 133 mg/dL (01 Oct 2024 12:04)  POCT Blood Glucose.: 118 mg/dL (01 Oct 2024 08:22)  POCT Blood Glucose.: 112 mg/dL (30 Sep 2024 22:14)  POCT Blood Glucose.: 171 mg/dL (30 Sep 2024 19:09)  POCT Blood Glucose.: 91 mg/dL (30 Sep 2024 17:31)    I&O's Summary      24hrs Vital:  T(C): 36.6 (10-01-24 @ 10:30), Max: 37.1 (10-01-24 @ 06:45)  HR: 67 (10-01-24 @ 10:30) (61 - 81)  BP: 117/73 (10-01-24 @ 10:30) (117/73 - 179/85)  RR: 18 (10-01-24 @ 10:30) (14 - 22)  SpO2: 96% (10-01-24 @ 10:30) (96% - 100%)    PHYSICAL EXAM:  GENERAL: NAD, well-developed  HEAD:  Atraumatic, Normocephalic  EYES: EOMI, PERRLA, conjunctiva and sclera clear  NECK: Supple, No JVD  CHEST/LUNG: Clear to auscultation bilaterally; No wheeze  HEART: S1S2; No rubs, or gallops, no murmurs  ABDOMEN: Soft, Nontender; Bowel sounds present  EXTREMITIES:  + Peripheral Pulses, No clubbing or cyanosis, no edema  PSYCH: AO x 3,   NEUROLOGY: Alert, no focal motor or sensory deficits  SKIN: No rashes or lesions    LABS:                        13.0   7.69  )-----------( 148      ( 30 Sep 2024 11:15 )             39.1     10-01    138  |  106  |  20  ----------------------------<  132[H]  3.8   |  20[L]  |  1.36[H]    Ca    9.5      01 Oct 2024 11:55            Urinalysis Basic - ( 01 Oct 2024 11:55 )    Color: x / Appearance: x / SG: x / pH: x  Gluc: 132 mg/dL / Ketone: x  / Bili: x / Urobili: x   Blood: x / Protein: x / Nitrite: x   Leuk Esterase: x / RBC: x / WBC x   Sq Epi: x / Non Sq Epi: x / Bacteria: x        RADIOLOGY & ADDITIONAL TESTS:    Consultant(s) Notes Reviewed:      Care Discussed with Consultants/Other Providers:  
NEPHROLOGY - NSN    Patient seen and examined.    HPI:  79 y.o. male with PMH of HTN, HLD, Diabetes Mellitus 2,  Prostate cancer, Hypothyroidism - presents today for elective cardiac catheterization. The patient c/o midsternal chest pain started 1.5 years ago, on and off, 1-2/10, aggravate with exertion (climbing 2 flights of stairs), resolve with rest. He admits to SOB with exertion, occasional dizziness. The patient denies palpitations, presyncope, syncope,  headache, visual disturbances, CVA, PE, DVT, AJ, abdominal pain, N/V/D/C, hematochezia, melena, dysuria, hematuria, fever, chills. The patient was evaluated by a cardiologist, was found to have normal  stress test. Due to patient's symptoms, the patient  was recommended to have cardiac catheterization. The patient denies any complaints at present.     referring physician, Dr. Romeo  stress test 1/5/24 -  EF 74%, no evidence of stress induced ischemia.   Echo 1/2024 - EF 58%, Mild diastolic dysfunction (30 Sep 2024 10:24)    Pt got a cath and more dye was need for stent placement   Pt is unaware of renal dysfunction   There is no hematuria or bubbles in the urine.  No history of NSAIDS or nephrolithisis.  The patient urinates once or twice in the night and there is no incontinence.  No family hx or renal disease or back pain.    No recent abx use.  No alleviating or aggravating factors with respect to the kidneys.     PAST MEDICAL & SURGICAL HISTORY:  Diabetes type 2, controlled      HTN (hypertension)      Hypercholesterolemia      Prostate cancer      Hypothyroidism      No significant past surgical history          MEDICATIONS  (STANDING):  amLODIPine   Tablet 5 milliGRAM(s) Oral daily  atorvastatin 80 milliGRAM(s) Oral at bedtime  dextrose 5%. 1000 milliLiter(s) (50 mL/Hr) IV Continuous <Continuous>  dextrose 5%. 1000 milliLiter(s) (100 mL/Hr) IV Continuous <Continuous>  dextrose 50% Injectable 25 Gram(s) IV Push once  dextrose 50% Injectable 12.5 Gram(s) IV Push once  dextrose 50% Injectable 25 Gram(s) IV Push once  fenofibrate Tablet 48 milliGRAM(s) Oral at bedtime  glucagon  Injectable 1 milliGRAM(s) IntraMuscular once  insulin lispro (ADMELOG) corrective regimen sliding scale   SubCutaneous three times a day before meals  levothyroxine 88 MICROGram(s) Oral daily  sodium chloride 0.9% lock flush 3 milliLiter(s) IV Push every 8 hours  sodium chloride 0.9%. 500 milliLiter(s) (75 mL/Hr) IV Continuous <Continuous>  sodium chloride 0.9%. 500 milliLiter(s) (75 mL/Hr) IV Continuous <Continuous>      Allergies    No Known Allergies    Intolerances        SOCIAL HISTORY:  Denies alcohol abuse, drug abuse or tobacco usage.     FAMILY HISTORY:      VITALS:  T(C): 36.9 (09-30-24 @ 10:27), Max: 36.9 (09-30-24 @ 10:27)  HR: 68 (09-30-24 @ 16:00) (61 - 70)  BP: 154/78 (09-30-24 @ 16:00) (139/69 - 154/79)  RR: 18 (09-30-24 @ 16:00) (14 - 20)  SpO2: 97% (09-30-24 @ 16:00) (97% - 100%)    REVIEW OF SYSTEMS:  Denies any nausea, vomiting, diarrhea, fever or chills  All other pertinent systems are reviewed and are negative.    PHYSICAL EXAM:  Constitutional: NAD  HEENT: EOMI  Neck:  No JVD, supple   Respiratory: CTA B/L  Cardiovascular: S1 and S2, RRR  Gastrointestinal: + BS, soft, NT, ND  Extremities: No peripheral edema, + peripheral pulses  Neurological: A/O x 3, CN2-12 intact  Psychiatric: Normal mood, normal affect  : No Patel  Skin: No rashes, C/D/I  Access: Not applicable    I and O's:    Height (cm): 165.1 (09-30 @ 10:27)  Weight (kg): 65.8 (09-30 @ 10:27)  BMI (kg/m2): 24.1 (09-30 @ 10:27)  BSA (m2): 1.73 (09-30 @ 10:27)    LABS:                        13.0   7.69  )-----------( 148      ( 30 Sep 2024 11:15 )             39.1     09-30    142  |  107  |  25[H]  ----------------------------<  116[H]  3.9   |  21[L]  |  1.45[H]    Ca    9.3      30 Sep 2024 11:15        URINE:  Urinalysis Basic - ( 30 Sep 2024 11:15 )    Color: x / Appearance: x / SG: x / pH: x  Gluc: 116 mg/dL / Ketone: x  / Bili: x / Urobili: x   Blood: x / Protein: x / Nitrite: x   Leuk Esterase: x / RBC: x / WBC x   Sq Epi: x / Non Sq Epi: x / Bacteria: x        RADIOLOGY & ADDITIONAL STUDIES:

## 2024-10-01 NOTE — DISCHARGE NOTE PROVIDER - CARE PROVIDERS DIRECT ADDRESSES
[email or web address]

## 2024-10-01 NOTE — PROGRESS NOTE ADULT - SUBJECTIVE AND OBJECTIVE BOX
NEPHROLOGY-[facility] [phone]        Patient seen and examined in bed.  He was the same         MEDICATIONS  (STANDING):  amLODIPine   Tablet 5 milliGRAM(s) Oral daily  aspirin enteric coated 81 milliGRAM(s) Oral daily  atorvastatin 80 milliGRAM(s) Oral at bedtime  dextrose 5%. 1000 milliLiter(s) (100 mL/Hr) IV Continuous <Continuous>  dextrose 5%. 1000 milliLiter(s) (50 mL/Hr) IV Continuous <Continuous>  dextrose 50% Injectable 25 Gram(s) IV Push once  dextrose 50% Injectable 12.5 Gram(s) IV Push once  dextrose 50% Injectable 25 Gram(s) IV Push once  fenofibrate Tablet 48 milliGRAM(s) Oral at bedtime  glucagon  Injectable 1 milliGRAM(s) IntraMuscular once  insulin lispro (ADMELOG) corrective regimen sliding scale   SubCutaneous three times a day before meals  levothyroxine 88 MICROGram(s) Oral daily  sodium chloride 0.9% lock flush 3 milliLiter(s) IV Push every 8 hours  sodium chloride 0.9%. 500 milliLiter(s) (75 mL/Hr) IV Continuous <Continuous>  sodium chloride 0.9%. 500 milliLiter(s) (75 mL/Hr) IV Continuous <Continuous>  ticagrelor 90 milliGRAM(s) Oral every 12 hours      VITAL:  T(C): , Max: 37.1 (10-01-24 @ 06:45)  T(F): , Max: 98.7 (10-01-24 @ 06:45)  HR: 67 (10-01-24 @ 10:30)  BP: 117/73 (10-01-24 @ 10:30)  BP(mean): --  RR: 18 (10-01-24 @ 10:30)  SpO2: 96% (10-01-24 @ 10:30)  Wt(kg): --    I and O's:    Height (cm): 165.1 (09-30 @ 18:57)  Weight (kg): 65.8 (09-30 @ 18:57)  BMI (kg/m2): 24.1 (09-30 @ 18:57)  BSA (m2): 1.73 (09-30 @ 18:57)    PHYSICAL EXAM:    Constitutional: NAD  Neck:  No JVD  Respiratory: CTAB/L  Cardiovascular: S1 and S2  Gastrointestinal: BS+, soft, NT/ND  Extremities: No peripheral edema  Neurological: A/O x 3, no focal deficits  Psychiatric: Normal mood, normal affect  : No Patel  Skin: No rashes  Access: Not applicable    LABS:                        13.0   7.69  )-----------( 148      ( 30 Sep 2024 11:15 )             39.1     10-01    138  |  106  |  20  ----------------------------<  132[H]  3.8   |  20[L]  |  1.36[H]    Ca    9.5      01 Oct 2024 11:55            Urine Studies:  Urinalysis Basic - ( 01 Oct 2024 11:55 )    Color: x / Appearance: x / SG: x / pH: x  Gluc: 132 mg/dL / Ketone: x  / Bili: x / Urobili: x   Blood: x / Protein: x / Nitrite: x   Leuk Esterase: x / RBC: x / WBC x   Sq Epi: x / Non Sq Epi: x / Bacteria: x            RADIOLOGY & ADDITIONAL STUDIES:            
Date of service 10/1/24    Denies chest pain or SOB, ROS otherwise negative    Review of Systems:   Constitutional: [ ] fevers, [ ] chills.   Skin: [ ] dry skin. [ ] rashes.  Psychiatric: [ ] depression, [ ] anxiety.   Gastrointestinal: [ ] BRBPR, [ ] melena.   Neurological: [ ] confusion. [ ] seizures. [ ] shuffling gait.   Ears,Nose,Mouth and Throat: [ ] ear pain [ ] sore throat.   Eyes: [ ] diplopia.   Respiratory: [ ] hemoptysis. [ ] shortness of breath  Cardiovascular: See HPI above  Hematologic/Lymphatic: [ ] anemia. [ ] painful nodes. [ ] prolonged bleeding.   Genitourinary: [ ] hematuria. [ ] flank pain.   Endocrine: [ ] significant change in weight. [ ] intolerance to heat and cold.     Review of systems [ x] otherwise negative, [ ] otherwise unable to obtain    FH: no family history of sudden cardiac death in first degree relatives    SH: [ ] tobacco, [ ] alcohol, [ ] drugs    amLODIPine   Tablet 5 milliGRAM(s) Oral daily  aspirin enteric coated 81 milliGRAM(s) Oral daily  atorvastatin 80 milliGRAM(s) Oral at bedtime  fenofibrate Tablet 48 milliGRAM(s) Oral at bedtime  glucagon  Injectable 1 milliGRAM(s) IntraMuscular once  insulin lispro (ADMELOG) corrective regimen sliding scale   SubCutaneous three times a day before meals  levothyroxine 88 MICROGram(s) Oral daily  ticagrelor 90 milliGRAM(s) Oral every 12 hours                            13.0   7.69  )-----------( 148      ( 30 Sep 2024 11:15 )             39.1       138  |  106  |  20  ----------------------------<  132[H]  3.8   |  20[L]  |  1.36[H]    Ca    9.5      01 Oct 2024 11:55      T(C): 36.6 (10-01-24 @ 10:30), Max: 37.1 (10-01-24 @ 06:45)  HR: 67 (10-01-24 @ 10:30) (61 - 81)  BP: 117/73 (10-01-24 @ 10:30) (117/73 - 179/85)  RR: 18 (10-01-24 @ 10:30) (14 - 22)  SpO2: 96% (10-01-24 @ 10:30) (96% - 100%)      General: Well nourished in no acute distress. Alert and Oriented * 3.   Head: Normocephalic and atraumatic.   Neck: No JVD. No bruits. Supple. Does not appear to be enlarged.   Cardiovascular: + S1,S2 ; RRR Soft systolic murmur at the left lower sternal border. No rubs noted.    Lungs: CTA b/l. No rhonchi, rales or wheezes.   Abdomen: + BS, soft. Non tender. Non distended. No rebound. No guarding.   Extremities: No clubbing/cyanosis/edema.   Neurologic: Moves all four extremities. Full range of motion.   Skin: Warm and moist. The patient's skin has normal elasticity and good skin turgor.   Psychiatric: Appropriate mood and affect.  Musculoskeletal: Normal range of motion, normal strength    DATA    < from: Cardiac Catheterization (09.30.24 @ 12:35) >  Conclusions:   Heavily calcified stenosis from proximal LAD to mid LAD, vessel  stented with 2 JONO from Prox LAD to mid LAD, [facility]    FFR performed at was significant at 0.73. THe vessel has IVL performed    LVEDP of 20   Recommendations:     C/w ASA and Brilinta for 1 year     < end of copied text >      ASSESSMENT AND PLAN    79 year old male HTN, HLD, DM2, Prostate ca, and Hypothyroidism, who has been having chest pain on exertion, AGRAWAL and dizziness.  Recent NST in the office with preserved LV function and no ischemia, but given risk factors and symptoms and concern for Obstructive CAD, he was referred for elective LHC.  LHC 9/30 revealed prox LAD stenosis s/p JONO x 2.    --started on DAPT with ASA and Brilinta s/p JONO  --please confirm Brilinta is covered/available at her pharmacy  --cont Atorvastatin and fenofibrate for HLD and hypertriglyceridemia  --cont Losartan 50mg for HTN (home med)/ on Norvasc 5mg here  --DC home today      F/U with Dr Romeo 10/11 at 1:15PM, [phone]    Naz FRIED  [phone]

## 2024-10-01 NOTE — DISCHARGE NOTE PROVIDER - NSDCCPCAREPLAN_GEN_ALL_CORE_FT
PRINCIPAL DISCHARGE DIAGNOSIS  Diagnosis: S/P cardiac catheterization  Assessment and Plan of Treatment: Activity: Rest today. You may drive in 24 hours. NO strenuous activity, straining, heavy lifting, pushing or pulling for 3 days.  Bandage: Keep bandage clean and dry. Remove after 24 hours.    Bathing: You may shower tomorrow. No baths/sitting in pools for 7 days.  Medications: Take those medicines reviewed today with you on the medication reconciliation sheet. If you had a balloon or stent procedure, DO NOT STOP taking the medicines to keep your stent open without first asking your Cardiologist.  Diet: See instructions above. Drink at least 8 glasses of water today unless you are told otherwise.   Special Instructions: If the procedure was done in your wrist, avoid reaching or placing too much pressure on that arm or wrist for 48 hours. If the procedure was done in your leg ( groin), limit stair climbing for 48 hours.  If you have bleeding from the procedure site: Lie down and remove the bandage. Apply hard pressure and call your doctor. If bleeding and swelling cannot be controlled, call 911.  Call your doctor immediately if: 1) You have severe pain, swelling, or bruising at the procedure site. A small amount or soreness and bruising (black and blue) is normal. 2) Procedure site becomes very red or feels hot to touch. 3) Your hand becomes blue or feels cold to touch. 4)You feel sudden back or belly pain. 5)You develop fever.  Contact information: If you are unable to reach your Doctor, call the cardiology office at [facility] [phone] (Monday-Friday 8am-5pm). After 5pm and on weekends, please call , and ask for "cardiology fellow".

## 2024-10-01 NOTE — DISCHARGE NOTE PROVIDER - CARE PROVIDER_API CALL
Meaghan Gonzalez  Family Medicine  [facility] [facility], [place], [address] [place] [address]  Phone: [phone]  Fax: [phone]  Follow Up Time:    Meaghan Gonzalez  Family Medicine  [facility] [facility], [place], [address]  [place] [address]  Phone: [phone]  Fax: [phone]  Follow Up Time:     Gavino Romeo  Cardiovascular Disease  [address] [place] [address]  Phone: [phone]  Fax: [phone]  Follow Up Time:

## 2024-10-01 NOTE — CONSULT NOTE ADULT - PROBLEM SELECTOR RECOMMENDATION 9
HbA1C 6.7  acceptable   finger sticks with short acting insulin sliding scale  no oral meds  diabetic diet  monitor for hypoglycemia

## 2024-10-01 NOTE — DISCHARGE NOTE PROVIDER - PROVIDER TOKENS
PROVIDER:[TOKEN:[[id number]]] PROVIDER:[TOKEN:[[id number]]],PROVIDER:[TOKEN:[[id number]]]

## 2024-10-01 NOTE — DISCHARGE NOTE PROVIDER - NSDCFUADDAPPT_GEN_ALL_CORE_FT
Please follow up with your primary care provider in 1-2 weeks following discharge from the hospital for continued monitoring and management.     Please follow up with your cardiologist in 1-2 weeks following discharge.

## 2024-10-01 NOTE — PROGRESS NOTE ADULT - NS ATTEND AMEND GEN_ALL_CORE FT
Patient seen and examined. Agree with plan as detailed in PA/NP Note.    Radial site stable, c/w ASA and Brilinta for minimum 6 months    Harry Flaherty MD  Pager: [phone]

## 2024-10-01 NOTE — DISCHARGE NOTE PROVIDER - NSDCMRMEDTOKEN_GEN_ALL_CORE_FT
amLODIPine 5 mg oral tablet: 1 tab(s) orally once a day  aspirin 81 mg oral tablet: 1 tab(s) orally once a day  atorvastatin 80 mg oral tablet: 1 tab(s) orally once a day (at bedtime)  empagliflozin 25 mg oral tablet: 1 tab(s) orally once a day  fenofibrate 54 mg oral tablet: 1 tab(s) orally once a day  glipiZIDE 5 mg oral tablet: 1 tab(s) orally once a day  levothyroxine 88 mcg (0.088 mg) oral tablet: 1 tab(s) orally once a day  metFORMIN 1000 mg oral tablet: 1 tab(s) orally 2 times a day  ticagrelor 90 mg oral tablet: 1 tab(s) orally every 12 hours  Vitamin D2 50, 000 units weekly:

## 2024-10-01 NOTE — CHART NOTE - NSCHARTNOTEFT_GEN_A_CORE
KARIME RESTREPO is a 79y Male s/p cath right radial site check. Dressing is clear/dry/intact. Site is without hematoma or bleeding.   Patient denies pain, numbness, tingling, CP, SOB. VSS. Will continue to monitor.       Vital Signs Last 24 Hrs  T(C): 36.8 (30 Sep 2024 22:25), Max: 36.9 (30 Sep 2024 10:27)  T(F): 98.2 (30 Sep 2024 22:25), Max: 98.4 (30 Sep 2024 10:27)  HR: 73 (30 Sep 2024 22:25) (61 - 83)  BP: 148/78 (30 Sep 2024 22:25) (139/69 - 179/85)  BP(mean): --  RR: 18 (30 Sep 2024 22:25) (14 - 22)  SpO2: 96% (30 Sep 2024 22:25) (96% - 100%)    Parameters below as of 30 Sep 2024 22:25  Patient On (Oxygen Delivery Method): room air      Pulses palpable, cap refill <2 sec.     Anne Grant PA-C  Department of Medicine, [id number]

## 2024-10-01 NOTE — PROGRESS NOTE ADULT - ASSESSMENT
79 y.o. male with PMH of HTN, HLD, Diabetes Mellitus 2,  Prostate cancer, Hypothyroidism sp stent to LAD   CKD stage 3a   Hypertension     1 Renal- Creatinine is better.  No VIRA at present   Check UA and assess for proteinuria   2 CVS- If able will start Valsartan   Hydralazine if needed to control the BP   3 Endo-Maximize statins as u are doing   SGLT as outpt     [facility]   [phone]

## 2024-10-01 NOTE — DISCHARGE NOTE NURSING/CASE MANAGEMENT/SOCIAL WORK - NSDCPEFALRISK_GEN_ALL_CORE
For information on Fall & Injury Prevention, visit: [email or web address] OR  [email or web address] OR  https://www.cdc.gov/steadi/patient.html

## 2024-10-01 NOTE — DISCHARGE NOTE NURSING/CASE MANAGEMENT/SOCIAL WORK - PATIENT PORTAL LINK FT
You can access the FollowMyHealth Patient Portal offered by [facility] by registering at the following website: [email or web address]. By joining [employer]s FollowMyHealth portal, you will also be able to view your health information using other applications (apps) compatible with our system.

## 2024-10-01 NOTE — DISCHARGE NOTE PROVIDER - HOSPITAL COURSE
79 y.o. PMHx hypothyroidism, prostate cancer, HLD, HTN, T2DM presents today for elective cardiac catheterization. s/p LHC LAD 80%, JONO x2 placed via RRA. Site check stable. DAPT with ASA + Brilinta.     On 10/1, discussed with Dr. Flaherty, patient is medically cleared and optimized for discharge today. All medications were reviewed with attending, and sent to mutually agreed upon pharmacy.  Reviewed discharge medications with patient; All new medications requiring new prescription sent to pharmacy of patients choice. Reviewed need for prescription for previous home medications and new prescriptions sent if requested. Patient in agreement and understands. 79 y.o. PMHx hypothyroidism, prostate cancer, HLD, HTN, T2DM presents today for elective cardiac catheterization. s/p LHC LAD 80%, JONO x2 placed via RRA. Site check stable. DAPT with ASA + Brilinta. Scr improved S/p Cath. Nephrology/cardiology cleared for DC.    On 10/1, discussed with Dr. Flaherty, patient is medically cleared and optimized for discharge today. All medications were reviewed with attending, and sent to mutually agreed upon pharmacy.  Reviewed discharge medications with patient; All new medications requiring new prescription sent to pharmacy of patients choice. Reviewed need for prescription for previous home medications and new prescriptions sent if requested. Patient in agreement and understands.

## 2024-11-01 ENCOUNTER — EMERGENCY (EMERGENCY)
Facility: HOSPITAL | Age: 79
LOS: 1 days | Discharge: HOME | End: 2024-11-01
Attending: EMERGENCY MEDICINE | Admitting: STUDENT IN AN ORGANIZED HEALTH CARE EDUCATION/TRAINING PROGRAM
Payer: MEDICAID

## 2024-11-01 VITALS
HEART RATE: 68 BPM | SYSTOLIC BLOOD PRESSURE: 137 MMHG | WEIGHT: 136.91 LBS | RESPIRATION RATE: 18 BRPM | TEMPERATURE: 98 F | DIASTOLIC BLOOD PRESSURE: 70 MMHG | HEIGHT: 65 IN | OXYGEN SATURATION: 99 %

## 2024-11-01 LAB
ALBUMIN SERPL ELPH-MCNC: 4.2 G/DL — SIGNIFICANT CHANGE UP (ref 3.3–5)
ALP SERPL-CCNC: 34 U/L — LOW (ref 40–120)
ALT FLD-CCNC: 18 U/L — SIGNIFICANT CHANGE UP (ref 4–41)
ANION GAP SERPL CALC-SCNC: 18 MMOL/L — HIGH (ref 7–14)
AST SERPL-CCNC: 20 U/L — SIGNIFICANT CHANGE UP (ref 4–40)
BASOPHILS # BLD AUTO: 0.01 K/UL — SIGNIFICANT CHANGE UP (ref 0–0.2)
BASOPHILS NFR BLD AUTO: 0.1 % — SIGNIFICANT CHANGE UP (ref 0–2)
BILIRUB SERPL-MCNC: 0.4 MG/DL — SIGNIFICANT CHANGE UP (ref 0.2–1.2)
BUN SERPL-MCNC: 18 MG/DL — SIGNIFICANT CHANGE UP (ref 7–23)
CALCIUM SERPL-MCNC: 10 MG/DL — SIGNIFICANT CHANGE UP (ref 8.4–10.5)
CHLORIDE SERPL-SCNC: 108 MMOL/L — HIGH (ref 98–107)
CO2 SERPL-SCNC: 19 MMOL/L — LOW (ref 22–31)
CREAT SERPL-MCNC: 1.59 MG/DL — HIGH (ref 0.5–1.3)
CRP SERPL-MCNC: <3 MG/L — SIGNIFICANT CHANGE UP
EGFR: 44 ML/MIN/1.73M2 — LOW
EOSINOPHIL # BLD AUTO: 0.09 K/UL — SIGNIFICANT CHANGE UP (ref 0–0.5)
EOSINOPHIL NFR BLD AUTO: 1.2 % — SIGNIFICANT CHANGE UP (ref 0–6)
ERYTHROCYTE [SEDIMENTATION RATE] IN BLOOD: 23 MM/HR — HIGH (ref 1–15)
FLUAV AG NPH QL: SIGNIFICANT CHANGE UP
FLUBV AG NPH QL: SIGNIFICANT CHANGE UP
GLUCOSE SERPL-MCNC: 30 MG/DL — CRITICAL LOW (ref 70–99)
HCT VFR BLD CALC: 35.9 % — LOW (ref 39–50)
HGB BLD-MCNC: 11.9 G/DL — LOW (ref 13–17)
IANC: 5.52 K/UL — SIGNIFICANT CHANGE UP (ref 1.8–7.4)
IMM GRANULOCYTES NFR BLD AUTO: 0.5 % — SIGNIFICANT CHANGE UP (ref 0–0.9)
LYMPHOCYTES # BLD AUTO: 1.37 K/UL — SIGNIFICANT CHANGE UP (ref 1–3.3)
LYMPHOCYTES # BLD AUTO: 17.9 % — SIGNIFICANT CHANGE UP (ref 13–44)
MCHC RBC-ENTMCNC: 29.1 PG — SIGNIFICANT CHANGE UP (ref 27–34)
MCHC RBC-ENTMCNC: 33.1 G/DL — SIGNIFICANT CHANGE UP (ref 32–36)
MCV RBC AUTO: 87.8 FL — SIGNIFICANT CHANGE UP (ref 80–100)
MONOCYTES # BLD AUTO: 0.62 K/UL — SIGNIFICANT CHANGE UP (ref 0–0.9)
MONOCYTES NFR BLD AUTO: 8.1 % — SIGNIFICANT CHANGE UP (ref 2–14)
NEUTROPHILS # BLD AUTO: 5.52 K/UL — SIGNIFICANT CHANGE UP (ref 1.8–7.4)
NEUTROPHILS NFR BLD AUTO: 72.2 % — SIGNIFICANT CHANGE UP (ref 43–77)
NRBC # BLD: 0 /100 WBCS — SIGNIFICANT CHANGE UP (ref 0–0)
NRBC # FLD: 0 K/UL — SIGNIFICANT CHANGE UP (ref 0–0)
NT-PROBNP SERPL-SCNC: 344 PG/ML — HIGH
PLATELET # BLD AUTO: 136 K/UL — LOW (ref 150–400)
POTASSIUM SERPL-MCNC: 3.5 MMOL/L — SIGNIFICANT CHANGE UP (ref 3.5–5.3)
POTASSIUM SERPL-SCNC: 3.5 MMOL/L — SIGNIFICANT CHANGE UP (ref 3.5–5.3)
PROT SERPL-MCNC: 7.2 G/DL — SIGNIFICANT CHANGE UP (ref 6–8.3)
RBC # BLD: 4.09 M/UL — LOW (ref 4.2–5.8)
RBC # FLD: 13.6 % — SIGNIFICANT CHANGE UP (ref 10.3–14.5)
RSV RNA NPH QL NAA+NON-PROBE: SIGNIFICANT CHANGE UP
SARS-COV-2 RNA SPEC QL NAA+PROBE: SIGNIFICANT CHANGE UP
SODIUM SERPL-SCNC: 145 MMOL/L — SIGNIFICANT CHANGE UP (ref 135–145)
TROPONIN T, HIGH SENSITIVITY RESULT: 13 NG/L — SIGNIFICANT CHANGE UP
WBC # BLD: 7.65 K/UL — SIGNIFICANT CHANGE UP (ref 3.8–10.5)
WBC # FLD AUTO: 7.65 K/UL — SIGNIFICANT CHANGE UP (ref 3.8–10.5)

## 2024-11-01 PROCEDURE — 93010 ELECTROCARDIOGRAM REPORT: CPT

## 2024-11-01 PROCEDURE — 99285 EMERGENCY DEPT VISIT HI MDM: CPT

## 2024-11-01 PROCEDURE — 71046 X-RAY EXAM CHEST 2 VIEWS: CPT | Mod: 26

## 2024-11-01 RX ORDER — ASPIRIN/MAG CARB/ALUMINUM AMIN 325 MG
324 TABLET ORAL ONCE
Refills: 0 | Status: COMPLETED | OUTPATIENT
Start: 2024-11-01 | End: 2024-11-01

## 2024-11-01 RX ADMIN — Medication 324 MILLIGRAM(S): at 17:31

## 2024-11-01 NOTE — ED ADULT NURSE NOTE - NSFALLUNIVINTERV_ED_ALL_ED
Bed/Stretcher in lowest position, wheels locked, appropriate side rails in place/Call bell, personal items and telephone in reach/Instruct patient to call for assistance before getting out of bed/chair/stretcher/Non-slip footwear applied when patient is off stretcher/[place] to call system/Physically safe environment - no spills, clutter or unnecessary equipment/Purposeful proactive rounding/Room/bathroom lighting operational, light cord in reach

## 2024-11-01 NOTE — ED ADULT NURSE NOTE - CHIEF COMPLAINT
Seen with PA team.  Back to normal baseline.  EEG in progress. On Keppra.  To get diagnostic angio today then discharge.  Will get functional MRI and meet again with family to further discuss embo, followed by resection. The patient is a 79y Male complaining of chest pain.

## 2024-11-01 NOTE — ED PROVIDER NOTE - OBJECTIVE STATEMENT
79y male with pmh DM2, HTN, HLD, unstable angina s/p two stents on 9/30 presenting today with intermittent chest pain at rest x 5 days. Patient was walking down a flight of stairs when he first noticed the pain in his chest, causing him to stop and catch his breath. Since then, patient has had intermittent pain at rest in his chest and extremities which is always worse in the morning. Pt also endorses feeling dizzy when getting up and feeling short of breath even at rest. Denies headache, fever, palpitations, numbness, paresthesias. 79y male with pmh DM2, HTN, HLD, unstable angina s/p two stents on 9/30 with Dr. Harry Flaherty presenting today with intermittent chest pain and SOB x 5 days. Patient was walking down a flight of stairs when he first noticed the pain in his chest, causing him to stop and catch his breath. Since then, patient has had intermittent pain at rest in his chest and extremities which is always worse in the morning. Pain is sternal, described as "cringey" and pleuritic.  Feels SOB intermittently also at rest. Has noticed a mild dry cough as well. Denies headache, fever, recent illness, sick contacts, travel, palpitations, numbness, paresthesias.

## 2024-11-01 NOTE — ED ADULT TRIAGE NOTE - CHIEF COMPLAINT QUOTE
pt brought in by EMS from [facility] office for chest pain and SOB. pt has a hx of 2 cardiac stents. pt denies n/v/d, fever or chills.

## 2024-11-01 NOTE — ED ADULT NURSE REASSESSMENT NOTE - NS ED NURSE REASSESS COMMENT FT1
Report received from day RN: pt resting comfortably in stretcher, breathing even and unlabored. Offers no complaints at this time. Instructed to call for assistance. Stretcher in lowest position, wheels locked, appropriate side rails in place, call bell in reach. Pending dispo

## 2024-11-01 NOTE — ED ADULT NURSE NOTE - OBJECTIVE STATEMENT
Pt presented to ER with c/o progressive non radiating intermitting L sided chest pain associated with sob x 2 days, denies n/v/d, reports numbness and tingling to R arm x months. In ER eval by Provider, labs obtained, will treat per plan of care.

## 2024-11-01 NOTE — ED PROVIDER NOTE - PROGRESS NOTE DETAILS
CORKY Verma- spoke with patient's cardiologist Dr. Harry Flaherty, story less concerning for acs. plan to check labs including troponin, esr, crp, place in cdu for echo. will discontinue brilinta given side effect with SOB. recommending pt take plavix 600mg 24 hours after brilinta dose. pt last brilinta dose was this morning. pt then can take plavix 75mg daily after. Saint Chau, DO (PGY2): patient signed out to me at 1900 pending labs. Briefly, he is 78 y/o male with history of CAD s/p stent in 9/2024 who presenteed to the ED from cardiologist office for SOB. Labs largely unremarkbale, including negative trop x1. Will repeat. Patient to go to CDU for echo in AM. Saint Chau, DO (PGY2): patient signed out to me at 1900 pending labs. Briefly, he is 78 y/o male with history of CAD s/p stent in 9/2024 who presented to the ED from cardiologist office for SOB. Labs largely unremarkable, including negative trop x1. Will repeat. Patient to go to CDU for echo in AM. Saint Chau, DO (PGY2): case discussed with CDU PA who accepted patient

## 2024-11-01 NOTE — ED ADULT NURSE NOTE - CHIEF COMPLAINT QUOTE
pt brought in by EMS from [facility] office for chest pain and SOB. pt has a hx of 2 cardiac stents. pt denies n/v/d, fever or chills.

## 2024-11-01 NOTE — ED PROVIDER NOTE - CLINICAL SUMMARY MEDICAL DECISION MAKING FREE TEXT BOX
79y male with pmh DM2, HTN, HLD, unstable angina s/p two stents on 9/30 presenting today with intermittent chest pain at rest x 5 days. Patient was walking down a flight of stairs when he first noticed the pain in his chest, causing him to stop and catch his breath. Since then, patient has had intermittent pain at rest in his chest and extremities which is always worse in the morning. Pt also endorses feeling dizzy when getting up and feeling short of breath even at rest. Denies headache, fever, palpitations, numbness, paresthesias. On exam, patient is 79y male with pmh DM2, HTN, HLD, unstable angina s/p two stents on 9/30 presenting today with intermittent chest pain and SOB x 5 days. CP is sternal, pleuritic, intermittent. Associated with SOB at rest in ED and mild cough.  ekg unchanged. vitals stable. plan to check labs including troponin r/o MI, esr and crp. low suspicion for dressler syndrome. d-dimer r/o PE, cxr, viral swab. cardiology consult.

## 2024-11-01 NOTE — ED PROVIDER NOTE - ATTENDING APP SHARED VISIT CONTRIBUTION OF CARE
Brief HPI: 71-year-old male past medical history of diabetes, hypertension, hyperlipidemia, unstable angina status post stent placement x 2 9/30/2024 presents with shortness of breath, chest pain for 5 days.  Patient states that symptoms are exertional, nonradiating, nonpositional, and also occur at rest.  No fevers, chills, leg swelling, hemoptysis, nausea, vomiting, sweating.  No history of DVT or PE.  Patient reports compliance with medications.    Vitals:   Reviewed    Exam:    GEN:  Non-toxic appearing, non-distressed, speaking full sentences, non-diaphoretic, AAOx3  HEENT:  NCAT, neck supple, EOMI, PERRLA, sclera anicteric, no conjunctival pallor or injection, no stridor, normal voice, no tonsillar exudate, uvula midline  CV:  regular rhythm and rate, s1/s2 audible, no murmurs, rubs or gallops, peripheral pulses 2+ and symmetric  PULM:  non-labored respirations, lungs clear to auscultation bilaterally, no wheezes, crackles or rales  ABD:  non distended, non-tender, no rebound, no guarding, negative Pickard's sign, bowel sounds normal, no cvat  MSK:  no gross deformity, non-tender extremities and joints, range of motion grossly normal appearing, no extremity edema, extremities warm and well perfused   NEURO:  AAOx3, CN II-XII intact, motor 5/5 in upper and lower extremities bilaterally, sensation grossly intact in extremities and trunk, finger to nose testing wnl, no nystagmus, negative Romberg, no pronator drift, no gait deficit  SKIN:  warm, dry, no rash or vesicles     A/P:   71-year-old male past medical history of diabetes, hypertension, hyperlipidemia, unstable angina status post stent placement x 2 9/30/2024 presents with shortness of breath, chest pain for 5 days.  Vital signs stable.  EKG nonischemic.  Possible persistent anginal symptoms.  No concern for PE given no tachycardia, no hypoxemia, no pleuritic nature to chest pain, no unilateral leg swelling.  Spoke with patient's cardiologist who is recommending labs with echo for evaluation of possible Dressler syndrome.  Disposition pending.

## 2024-11-02 ENCOUNTER — RESULT REVIEW (OUTPATIENT)
Age: 79
End: 2024-11-02

## 2024-11-02 VITALS — SYSTOLIC BLOOD PRESSURE: 146 MMHG | HEART RATE: 65 BPM | DIASTOLIC BLOOD PRESSURE: 87 MMHG | RESPIRATION RATE: 16 BRPM

## 2024-11-02 LAB
ADD ON TEST-SPECIMEN IN LAB: SIGNIFICANT CHANGE UP
D DIMER BLD IA.RAPID-MCNC: 251 NG/ML DDU — HIGH
TROPONIN T, HIGH SENSITIVITY RESULT: 17 NG/L — SIGNIFICANT CHANGE UP

## 2024-11-02 PROCEDURE — 93306 TTE W/DOPPLER COMPLETE: CPT | Mod: 26

## 2024-11-02 PROCEDURE — 93010 ELECTROCARDIOGRAM REPORT: CPT

## 2024-11-02 PROCEDURE — 99236 HOSP IP/OBS SAME DATE HI 85: CPT

## 2024-11-02 RX ORDER — CLOPIDOGREL 75 MG/1
600 TABLET ORAL ONCE
Refills: 0 | Status: DISCONTINUED | OUTPATIENT
Start: 2024-11-02 | End: 2024-11-02

## 2024-11-02 RX ORDER — AMLODIPINE BESYLATE 10 MG
5 TABLET ORAL DAILY
Refills: 0 | Status: DISCONTINUED | OUTPATIENT
Start: 2024-11-02 | End: 2024-11-05

## 2024-11-02 RX ORDER — CLOPIDOGREL 75 MG/1
600 TABLET ORAL
Refills: 0 | Status: COMPLETED | OUTPATIENT
Start: 2024-11-02 | End: 2024-11-02

## 2024-11-02 RX ORDER — FENOFIBRATE 145 MG/1
48 TABLET, FILM COATED ORAL DAILY
Refills: 0 | Status: DISCONTINUED | OUTPATIENT
Start: 2024-11-02 | End: 2024-11-05

## 2024-11-02 RX ORDER — ASPIRIN/MAG CARB/ALUMINUM AMIN 325 MG
81 TABLET ORAL DAILY
Refills: 0 | Status: DISCONTINUED | OUTPATIENT
Start: 2024-11-02 | End: 2024-11-05

## 2024-11-02 RX ORDER — LEVOTHYROXINE SODIUM 88 MCG
88 TABLET ORAL DAILY
Refills: 0 | Status: DISCONTINUED | OUTPATIENT
Start: 2024-11-02 | End: 2024-11-05

## 2024-11-02 RX ORDER — ACETAMINOPHEN 500 MG
1000 TABLET ORAL ONCE
Refills: 0 | Status: COMPLETED | OUTPATIENT
Start: 2024-11-02 | End: 2024-11-02

## 2024-11-02 RX ORDER — FUROSEMIDE 40 MG
20 TABLET ORAL ONCE
Refills: 0 | Status: COMPLETED | OUTPATIENT
Start: 2024-11-02 | End: 2024-11-02

## 2024-11-02 RX ADMIN — Medication 5 MILLIGRAM(S): at 05:40

## 2024-11-02 RX ADMIN — FENOFIBRATE 48 MILLIGRAM(S): 145 TABLET, FILM COATED ORAL at 12:26

## 2024-11-02 RX ADMIN — CLOPIDOGREL 600 MILLIGRAM(S): 75 TABLET ORAL at 07:38

## 2024-11-02 RX ADMIN — Medication 1000 MILLIGRAM(S): at 06:28

## 2024-11-02 RX ADMIN — Medication 88 MICROGRAM(S): at 05:40

## 2024-11-02 RX ADMIN — Medication 20 MILLIGRAM(S): at 12:07

## 2024-11-02 RX ADMIN — Medication 81 MILLIGRAM(S): at 12:07

## 2024-11-02 RX ADMIN — Medication 400 MILLIGRAM(S): at 05:40

## 2024-11-02 NOTE — ED ADULT NURSE REASSESSMENT NOTE - NS ED NURSE REASSESS COMMENT FT1
Report given to CDU RN, pt resting comfortably in stretcher, breathing even and unlabored. Offers no complaints at this time. NAD noted. Pending Echo

## 2024-11-02 NOTE — ED CDU PROVIDER INITIAL DAY NOTE - DETAILS
Tele monitoring, echo, recommendations as per Dr. Harry Flaherty, cardiologist following pt, general observation care / monitoring.

## 2024-11-02 NOTE — ED CDU PROVIDER INITIAL DAY NOTE - OBJECTIVE STATEMENT
79y male with pmh DM2, HTN, HLD, unstable angina s/p two stents on 9/30 with Dr. Harry Flaherty presenting today with intermittent chest pain and SOB x 5 days. Patient was walking down a flight of stairs when he first noticed the pain in his chest, causing him to stop and catch his breath. Since then, patient has had intermittent pain at rest in his chest and extremities which is always worse in the morning. Pain is sternal, described as "cringey" and pleuritic.  Feels SOB intermittently also at rest. Has noticed a mild dry cough as well. Denies headache, fever, recent illness, sick contacts, travel, palpitations, numbness, paresthesias.    CDU CORKY Barron Note-----  ED Provider Note as above, reviewed.  Pt is a 80 yo male, PMH CAD with stents placed during 9/30/24 - 10/1/24 [facility] admission, HTN, HLD, T2DM, hypothyroidism, prostate cancer; pt presented to the ED c/o intermittent chest pain and SOB x 5 days.  In the ED, VSS, pt afebrile.  EKG NSR @ 64 bpm, T wave flat in lead III (prior EKG 9/30/24 with inverted T waves in lead III).  WBC 7.65, Hb 11.9, platelets 136.  ESR 23, CRP <3.0; CMP: bicarb 19, anion gap 18, BUN 18, creatinine 1.59, glucose 30 (fingerstick checked: 70); proBNP 344, troponin 13 ---> 17.  COVID/FLU/RSV: NotDetected.  CXR with no acute findings.  The ED team spoke with pt's cardiologist Dr. Harry Flaherty, recs appreciated; advised tele monitoring, echo, and to switch to Plavix 11/2 daytime with initial dose of 600 milligrams to be given.  Pt was sent to CDU for continued care.

## 2024-11-02 NOTE — ED CDU PROVIDER DISPOSITION NOTE - NSFOLLOWUPINSTRUCTIONS_ED_ALL_ED_FT
Follow up with your cardiologist within 1 week  Follow up with your primary care doctor within 1 week  STOP TAKING BRILINTA, continue all other medications as previously prescribed to you  Start taking Plavix 75mg (1 tablet) once a day  Return to the ER with any worsening or concerning symptoms, chest pain, shortness of breath, leg swelling, weakness or any other concerns. Follow up with your cardiologist within 1 week, show copies of your results  Follow up with your primary care doctor within 1 week  STOP TAKING BRILINTA, continue all other medications as previously prescribed to you  Start taking Plavix 75mg (1 tablet) once a day  Return to the ER with any worsening or concerning symptoms, chest pain, shortness of breath, leg swelling, weakness or any other concerns.

## 2024-11-02 NOTE — ED CDU PROVIDER INITIAL DAY NOTE - ATTENDING APP SHARED VISIT CONTRIBUTION OF CARE
I have evaluated the patient and agree with the documentation and assessment as made by the PA. We have discussed plan of care and work up for the patient.    Exam:    GEN:  Non-toxic appearing, non-distressed, speaking full sentences, non-diaphoretic, AAOx3  HEENT:  NCAT, neck supple, EOMI, PERRLA, sclera anicteric, no conjunctival pallor or injection, no stridor, normal voice, no tonsillar exudate, uvula midline  CV:  regular rhythm and rate, s1/s2 audible, no murmurs, rubs or gallops, peripheral pulses 2+ and symmetric  PULM:  non-labored respirations, lungs clear to auscultation bilaterally, no wheezes, crackles or rales  ABD:  non distended, non-tender, no rebound, no guarding, negative Pickard's sign, bowel sounds normal, no cvat  MSK:  no gross deformity, non-tender extremities and joints, range of motion grossly normal appearing, no extremity edema, extremities warm and well perfused   NEURO:  AAOx3, CN II-XII intact, motor 5/5 in upper and lower extremities bilaterally, sensation grossly intact in extremities and trunk, finger to nose testing wnl, no nystagmus, negative Romberg, no pronator drift, no gait deficit  SKIN:  warm, dry, no rash or vesicles

## 2024-11-02 NOTE — ED CDU PROVIDER INITIAL DAY NOTE - CLINICAL SUMMARY MEDICAL DECISION MAKING FREE TEXT BOX
78 yo male, PMH CAD with stents placed during 9/30/24 - 10/1/24 [facility] admission, HTN, HLD, T2DM, hypothyroidism, prostate cancer; pt presented to the ED c/o intermittent chest pain and SOB x 5 days.  In the ED, VSS, pt afebrile.  EKG NSR @ 64 bpm, T wave flat in lead III (prior EKG 9/30/24 with inverted T waves in lead III).  WBC 7.65, Hb 11.9, platelets 136.  ESR 23, CRP <3.0; CMP: bicarb 19, anion gap 18, BUN 18, creatinine 1.59, glucose 30 (fingerstick checked: 70); proBNP 344, troponin 13 ---> 17.  COVID/FLU/RSV: NotDetected.  CXR with no acute findings.  The ED team spoke with pt's cardiologist Dr. Harry Flaherty, recs appreciated; advised tele monitoring, echo, and to switch to Plavix 11/2 daytime with initial dose of 600 milligrams to be given.  Pt was sent to CDU for continued care.

## 2024-11-02 NOTE — CONSULT NOTE ADULT - SUBJECTIVE AND OBJECTIVE BOX
C A R D I O L O G Y  *********************    DATE OF SERVICE: 11-02-24    HISTORY OF PRESENT ILLNESS: HPI: Pt is a 79 year old male CAD s/p PCI to LAD  with JONO x2 on 09/30. HTN, HLD, DM2, Prostate ca, and Hypothyroidism, who has been having AGRAWAL.  Recent NST in the office with preserved LV function and no ischemia, but given risk factors and symptoms and concern for Obstructive CAD, he was referred for elective LHC.  LHC 9/30 revealed prox LAD stenosis s/p JONO x 2. Now presents with SOB that started after the cath. States he does feel some chest discomfort occasionally when taking a deep breath. Denies orthopnea, reports compliance with meds, no recent fevers or sick contacts.      PAST MEDICAL & SURGICAL HISTORY:  Diabetes type 2, controlled  HTN (hypertension)  Hypercholesterolemia  Prostate cancer  Hypothyroidism  CAD (coronary artery disease)  No significant past surgical history      MEDICATIONS:  MEDICATIONS  (STANDING):  amLODIPine   Tablet 5 milliGRAM(s) Oral daily  aspirin enteric coated 81 milliGRAM(s) Oral daily  atorvastatin 80 milliGRAM(s) Oral at bedtime  fenofibrate Tablet 48 milliGRAM(s) Oral daily  levothyroxine 88 MICROGram(s) Oral daily    Allergies: No Known Allergies    FAMILY HISTORY:No pertinent family history in first degree relatives    SOCIAL HISTORY:    [X ] Non-smoker  [ ] Smoker  [ ] Alcohol    FLU VACCINE THIS YEAR STARTS IN AUGUST:  [ ] Yes    [ ] No    IF OVER 65 HAVE YOU EVER HAD A PNA VACCINE:  [ ] Yes    [ ] No       [ ] N/A      REVIEW OF SYSTEMS:  [ ]chest pain  [  ]shortness of breath  [  ]palpitations  [  ]syncope  [ ]near syncope [ ]upper extremity weakness   [ ] lower extremity weakness  [  ]diplopia  [  ]altered mental status   [  ]fevers  [ ]chills [ ]nausea  [ ]vomiting  [  ]dysphagia    [ ]abdominal pain  [ ]melena  [ ]BRBPR    [  ]epistaxis  [  ]rash    [ ]lower extremity edema    [X] All others negative	  [ ] Unable to obtain    LABS:	 	    CARDIAC MARKERS:               11.9   7.65  )-----------( 136      ( 01 Nov 2024 21:35 )             35.9     Hb Trend: 11.9<--    11-01    145  |  108[H]  |  18  ----------------------------<  30[LL]  3.5   |  19[L]  |  1.59[H]    Ca    10.0      01 Nov 2024 21:35  TPro  7.2  /  Alb  4.2  /  TBili  0.4  /  DBili  x   /  AST  20  /  ALT  18  /  AlkPhos  34[L]  11-01  Creatinine Trend: 1.59<--      PHYSICAL EXAM:  T(C): 36.4 (11-02-24 @ 09:33), Max: 36.7 (11-01-24 @ 14:53)  HR: 65 (11-02-24 @ 12:00) (56 - 68)  BP: 146/87 (11-02-24 @ 12:00) (130/63 - 161/90)  RR: 16 (11-02-24 @ 12:00) (16 - 18)  SpO2: 97% (11-02-24 @ 09:33) (94% - 100%)  Wt(kg): --   BMI (kg/m2): 22.8 (11-01-24 @ 14:53)  I&O's Summary      General:  Alert and Oriented * 3.   Head: Normocephalic and atraumatic.   Neck: No JVD. No bruits. Supple. Does not appear to be enlarged.   Cardiovascular: + S1,S2 ; RRR Soft systolic murmur at the left lower sternal border. No rubs noted.    Lungs: CTA b/l. No rhonchi, rales or wheezes.   Abdomen: + BS, soft. Non tender. Non distended. No rebound. No guarding.   Extremities: No clubbing/cyanosis/edema.   Neurologic: Moves all four extremities. Full range of motion.   Skin: Warm and moist. The patient's skin has normal elasticity and good skin turgor.   Psychiatric: Appropriate mood and affect.  Musculoskeletal: Normal range of motion, normal strength     TELEMETRY: 	NSR      ECG:  	NSR    RADIOLOGY:         CXR:   < from: Xray Chest 2 Views PA/Lat (11.01.24 @ 18:29) >    IMPRESSION:    No focal consolidation.    < end of copied text >    < from: TTE W or WO Ultrasound Enhancing Agent (11.02.24 @ 08:59) >      1. Left ventricular cavity is normal in size. Left ventricular wall thickness is normal. Left ventricular systolic function is normal with an ejection fraction of 65 % by Rodríguez's method of disks. There are no regional wall motion abnormalities seen.   2. There is mild (grade 1) left ventricular diastolic dysfunction.   3. Normal right ventricular cavity size and normal right ventricular systolic function.   4. Structurally normal mitral valve with normal leaflet excursion. There is calcification of the mitral valve annulus. There is trace mitral regurgitation.      < from: Cardiac Catheterization (09.30.24 @ 12:35) >  Heavily calcified stenosis from proximal LAD to mid LAD, vessel  stented with 2 JONO from Prox LAD to mid LAD, [facility]    FFR performed at was significant at 0.73. THe vessel has IVL performed    LVEDP of 20       ASSESSMENT/PLAN:  Pt is a 79 year old male CAD s/p PCI to LAD  with JONO x2 on 09/30. HTN, HLD, DM2, Prostate ca, and Hypothyroidism, who has been having AGRAWAL.  Recent NST in the office with preserved LV function and no ischemia, but given risk factors and symptoms and concern for Obstructive CAD, he was referred for elective LHC.  LHC 9/30 revealed prox LAD stenosis s/p JONO x 2. Now presents with SOB that started after the cath. States he does feel some chest discomfort occasionally when taking a deep breath. Denies orthopnea, reports compliance with meds, no recent fevers or sick contacts.    AGRAWAL/Chest Discomfort/HTN/HLD/CAD  - trop negative, EKG non ischemic, CRP negative making pericarditis less likely  - D-Dimer negative for age  - TTE with no effusion or RWMA  - C/w ASA  - Switch Brilinta to Plavix. Given 600 mg loading dose 24 hours after last dose of Brilinta then continue 75 mg daily  - cont Atorvastatin and fenofibrate for HLD and hypertriglyceridemia  - cont Losartan 50mg for HTN (home med)/ on Norvasc 5mg   - Euvolemic on exam but can give one time dose of 20 mg IV lasix as EDP was 20 on cath    Harry Flaherty MD  Pager: [phone]

## 2024-11-02 NOTE — ED CDU PROVIDER DISPOSITION NOTE - PATIENT PORTAL LINK FT
You can access the FollowMyHealth Patient Portal offered by [facility] by registering at the following website: [email or web address]. By joining [facility]’s FollowMyHealth portal, you will also be able to view your health information using other applications (apps) compatible with our system.

## 2024-11-02 NOTE — ED ADULT NURSE REASSESSMENT NOTE - NS ED NURSE REASSESS COMMENT FT1
PT is resting in stretcher, easily arousable to verbal stimuli. no apparent distress noted at this time. pt denies complaints at this time. hand off will be given to primary nurse when return to area.

## 2024-11-02 NOTE — ED CDU PROVIDER DISPOSITION NOTE - CLINICAL COURSE
79yM w/pmhx CAD with stents placed during 9/30/24 - 10/1/24 [facility] admission, HTN, HLD, T2DM, hypothyroidism, prostate cancer; pt presented to the ED c/o intermittent chest pain and SOB x 5 days.  In the ED, VSS, pt afebrile.  EKG NSR @ 64 bpm, T wave flat in lead III (prior EKG 9/30/24 with inverted T waves in lead III).  WBC 7.65, Hb 11.9, platelets 136.  ESR 23, CRP <3.0; CMP: bicarb 19, anion gap 18, BUN 18, creatinine 1.59, glucose 30 (fingerstick checked: 70); proBNP 344, troponin 13 ---> 17.  COVID/FLU/RSV: NotDetected.  CXR with no acute findings.  The ED team spoke with pt's cardiologist Dr. Harry Flaherty, recs appreciated; advised tele monitoring, echo, and to switch to Plavix 11/2 daytime with initial dose of 600 milligrams to be given.  Pt was sent to CDU for continued care. Echo resulted without acute abnormality, d-dimer ___. Cardiology recommended 1 time dose of IV lasix 20mg, ok for discharge with outpatient follow up. Pt will return to the ER with any worsening or concerning symptoms. At time of discharge pt is well appearing, vitals stable. Discharge discussed with CDU attending 79yM w/pmhx CAD with stents placed during 9/30/24 - 10/1/24 [facility] admission, HTN, HLD, T2DM, hypothyroidism, prostate cancer; pt presented to the ED c/o intermittent chest pain and SOB x 5 days.  In the ED, VSS, pt afebrile.  EKG NSR @ 64 bpm, T wave flat in lead III (prior EKG 9/30/24 with inverted T waves in lead III).  WBC 7.65, Hb 11.9, platelets 136.  ESR 23, CRP <3.0; CMP: bicarb 19, anion gap 18, BUN 18, creatinine 1.59, glucose 30 (fingerstick checked: 70); proBNP 344, troponin 13 ---> 17.  COVID/FLU/RSV: NotDetected.  CXR with no acute findings.  The ED team spoke with pt's cardiologist Dr. Harry Flaherty, recs appreciated; advised tele monitoring, echo, and to switch to Plavix 11/2 daytime with initial dose of 600 milligrams to be given.  Pt was sent to CDU for continued care. Echo resulted without acute abnormality, d-dimer 251 which is negative per age adjustment (>390).  Cardiology recommended 1 time dose of IV lasix 20mg, ok for discharge with outpatient follow up with prescription for plavix 75mg daily. Pt will return to the ER with any worsening or concerning symptoms. At time of discharge pt is well appearing, vitals stable. Discharge discussed with CDU attending

## 2024-11-02 NOTE — ED CDU PROVIDER INITIAL DAY NOTE - NSICDXPASTMEDICALHX_GEN_ALL_CORE_FT
PAST MEDICAL HISTORY:  CAD (coronary artery disease)     Diabetes type 2, controlled     HTN (hypertension)     Hypercholesterolemia     Hypothyroidism     Prostate cancer

## 2024-11-02 NOTE — ED CDU PROVIDER INITIAL DAY NOTE - PROGRESS NOTE DETAILS
Pt seen by cardiology , echo without acute abnormality, pt reports feeling shortness of breath, recommends 1 dose IV lasix 20mg and to check d-dimer. D-dimer 251, age adjusted this is negative (>390). Discussed with CDU attending and cardiology attending, ok for outpatient follow up with plan to stop Brilinta and stated plavix. Pt verbalized understanding of discharge plan. At time of discharge, vitals stable, pt is well appearing. He will f/u with his cardiologist and his primary care doctor within 1 week and will return to the ER with any worsening or concerning symptoms.

## 2024-11-03 RX ORDER — CLOPIDOGREL 75 MG/1
1 TABLET ORAL
Qty: 30 | Refills: 0
Start: 2024-11-03 | End: 2024-12-02

## 2024-11-03 NOTE — ED POST DISCHARGE NOTE - REASON FOR FOLLOW-UP
Other Patient's son called on behalf of patient and identified patient by mrn, name, and .  Stated that patient did not received prescription of 75 mg plavix once per day.  CDU disposition note reviewed and prescription sent to patient's pharmacy.